# Patient Record
Sex: MALE | Race: BLACK OR AFRICAN AMERICAN | Employment: FULL TIME | URBAN - NONMETROPOLITAN AREA
[De-identification: names, ages, dates, MRNs, and addresses within clinical notes are randomized per-mention and may not be internally consistent; named-entity substitution may affect disease eponyms.]

---

## 2021-03-02 ENCOUNTER — HOSPITAL ENCOUNTER (EMERGENCY)
Age: 46
Discharge: HOME | End: 2021-03-02
Payer: COMMERCIAL

## 2021-03-02 VITALS
WEIGHT: 270 LBS | DIASTOLIC BLOOD PRESSURE: 118 MMHG | RESPIRATION RATE: 16 BRPM | SYSTOLIC BLOOD PRESSURE: 185 MMHG | TEMPERATURE: 97.6 F | OXYGEN SATURATION: 99 % | BODY MASS INDEX: 35.78 KG/M2 | HEIGHT: 73 IN | HEART RATE: 94 BPM

## 2021-03-02 DIAGNOSIS — I10 ASYMPTOMATIC HYPERTENSION: ICD-10-CM

## 2021-03-02 DIAGNOSIS — I10 UNCONTROLLED HYPERTENSION: Primary | ICD-10-CM

## 2021-03-02 PROCEDURE — 99282 EMERGENCY DEPT VISIT SF MDM: CPT

## 2021-03-02 PROCEDURE — 99205 OFFICE O/P NEW HI 60 MIN: CPT

## 2021-03-02 RX ORDER — AMLODIPINE BESYLATE 5 MG/1
5 TABLET ORAL DAILY
Qty: 30 TABLET | Refills: 0 | Status: SHIPPED | OUTPATIENT
Start: 2021-03-02 | End: 2021-03-02 | Stop reason: SDUPTHER

## 2021-03-02 RX ORDER — AMLODIPINE BESYLATE 5 MG/1
5 TABLET ORAL DAILY
Qty: 30 TABLET | Refills: 0 | Status: SHIPPED | OUTPATIENT
Start: 2021-03-02 | End: 2021-03-31

## 2021-03-02 ASSESSMENT — ENCOUNTER SYMPTOMS
EYE DISCHARGE: 0
TROUBLE SWALLOWING: 0
NAUSEA: 0
ABDOMINAL PAIN: 0
DIARRHEA: 0
COUGH: 0
EYE REDNESS: 0
SHORTNESS OF BREATH: 0
RHINORRHEA: 0
SORE THROAT: 0
VOMITING: 0

## 2021-03-02 ASSESSMENT — PAIN DESCRIPTION - DESCRIPTORS: DESCRIPTORS: ACHING

## 2021-03-02 ASSESSMENT — PAIN DESCRIPTION - FREQUENCY: FREQUENCY: CONTINUOUS

## 2021-03-02 ASSESSMENT — PAIN SCALES - GENERAL: PAINLEVEL_OUTOF10: 2

## 2021-03-02 NOTE — ED NOTES
To [facility] with complaints of HTN. States he was at the dentist to get some teeth pulled Thursday but they wouldn't do it because his bp was to high. Tried to call clinic on [facility],  was 3 weeks out so came here.  No PCP     Priya Owen RN  03/02/21 2840

## 2021-03-02 NOTE — ED TRIAGE NOTES
Pt presents to ED from  as a transfer for continued eval of HTN. Pt was scheduled to have a tooth pulled on 2/25/21 and was unable to get it pulled d/t HTN. Upon iniaital assessment, pt is A&Ox4, resps easy and unlabored. Pt reports \"I feel fine! \" Pt denies chest pain, shortness of breath, abdominal pain, n/v/d, cough or fever. Pt denies blurred vision however states a slight headache. Genevieve MIR at bedside for assessment. Will monitor.

## 2021-03-02 NOTE — ED NOTES
To go by private car to [address] [place] [address] , to remain NPO till seen in ED, in stable condition     Almaz Wallis, KARIN  03/02/21 9735

## 2021-03-02 NOTE — ED PROVIDER NOTES
[facility] Encounter      CHIEFCOMPLAINT       Chief Complaint   Patient presents with    Hypertension     went to get teeth pulled and bp was to high to get them pulled       Nurses Notes reviewed and I agree except as noted in the HPI. HISTORY OF PRESENT ILLNESS   Isabel Quinn is a 39 y.o. male who presents for evaluation of blood pressure. Patient states he had went to get some teeth pulled last Thursday when they refuse due to elevated blood pressure. Presents today for evaluation, treatment, and referral for PCP. Patient is asymptomatic. Denies recurrent headache. No chest pain or shortness of breath. Current tobacco user. No exercise outside of work. No additional complaints. REVIEW OF SYSTEMS     Review of Systems   Constitutional: Negative for chills, diaphoresis, fatigue and fever. HENT: Negative for congestion, ear pain, rhinorrhea, sore throat and trouble swallowing. Eyes: Negative for discharge and redness. Respiratory: Negative for cough and shortness of breath. Cardiovascular: Negative for chest pain, palpitations and leg swelling. Gastrointestinal: Negative for diarrhea, nausea and vomiting. Genitourinary: Negative for decreased urine volume. Musculoskeletal: Negative for neck pain and neck stiffness. Skin: Negative for rash. Neurological: Negative for headaches. Hematological: Negative for adenopathy. Psychiatric/Behavioral: Negative for sleep disturbance. PAST MEDICAL HISTORY   History reviewed. No pertinent past medical history. SURGICAL HISTORY     Patient  has no past surgical history on file.     CURRENT MEDICATIONS       Previous Medications    ACETAMINOPHEN (TYLENOL) 500 MG TABLET    Take 500 mg by mouth every 6 hours as needed for Pain    AMOXICILLIN PO    Take by mouth    IBUPROFEN (ADVIL;MOTRIN) 400 MG TABLET    Take 400 mg by mouth every 6 hours as needed for Pain       ALLERGIES     Patient is has No Known Allergies. FAMILY HISTORY     Patient'sfamily history is not on file. SOCIAL HISTORY     Patient  reports that he has been smoking. He has been smoking about 1.00 pack per day. He has never used smokeless tobacco. He reports that he does not drink alcohol or use drugs. PHYSICAL EXAM     ED TRIAGE VITALS  BP: (!) 203/127, Temp: 97.6 °F (36.4 °C), Pulse: 85, Resp: 16, SpO2: 99 %  Physical Exam  Vitals signs and nursing note reviewed. Constitutional:       General: He is not in acute distress. Appearance: Normal appearance. He is well-developed. He is not ill-appearing, toxic-appearing or diaphoretic. HENT:      Head: Normocephalic and atraumatic. Jaw: No trismus. Right Ear: Hearing, tympanic membrane, ear canal and external ear normal. No mastoid tenderness. No hemotympanum. Tympanic membrane is not perforated, erythematous or bulging. Left Ear: Hearing, tympanic membrane, ear canal and external ear normal. No mastoid tenderness. No hemotympanum. Tympanic membrane is not perforated, erythematous or bulging. Nose: Nose normal.      Mouth/Throat:      Mouth: Mucous membranes are moist.      Pharynx: Oropharynx is clear. Uvula midline. Tonsils: No tonsillar abscesses. Eyes:      General: No scleral icterus. Extraocular Movements: Extraocular movements intact. Conjunctiva/sclera: Conjunctivae normal.      Pupils: Pupils are equal, round, and reactive to light. Neck:      Musculoskeletal: Normal range of motion and neck supple. Thyroid: No thyromegaly. Trachea: Trachea normal.   Cardiovascular:      Rate and Rhythm: Normal rate and regular rhythm. No extrasystoles are present. Chest Wall: PMI is not displaced. Heart sounds: Normal heart sounds. No murmur. No friction rub. No gallop. Pulmonary:      Effort: Pulmonary effort is normal. No accessory muscle usage or respiratory distress. Breath sounds: Normal breath sounds. Musculoskeletal:      Right lower leg: Edema (1+) present. Left lower leg: Edema (1+) present. Lymphadenopathy:      Head:      Right side of head: No submental, submandibular, tonsillar, preauricular, posterior auricular or occipital adenopathy. Left side of head: No submental, submandibular, tonsillar, preauricular, posterior auricular or occipital adenopathy. Cervical: No cervical adenopathy. Upper Body:      Right upper body: No supraclavicular adenopathy. Left upper body: No supraclavicular adenopathy. Skin:     General: Skin is warm and dry. Coloration: Skin is not pale. Findings: No rash. Comments: Skin intact, warm and dry to touch, no rashes noted on exposed surfaces. Neurological:      Mental Status: He is alert and oriented to person, place, and time. He is not disoriented. Psychiatric:         Mood and Affect: Mood normal.         Behavior: Behavior is cooperative. DIAGNOSTIC RESULTS   Labs: No results found for this visit on 03/02/21. IMAGING:  No orders to display     URGENT CARE COURSE:     Vitals:    03/02/21 1257 03/02/21 1317   BP: (!) 210/125 (!) 203/127   Pulse: 85    Resp: 16    Temp: 97.6 °F (36.4 °C)    TempSrc: Temporal    SpO2: 99%    Weight: 270 lb (122.5 kg)    Height: 6' 1\" (1.854 m)        Medications - No data to display  PROCEDURES:  None  FINALIMPRESSION      1. Uncontrolled hypertension        DISPOSITION/PLAN   DISPOSITION Decision To Transfer 03/02/2021 01:17:53 PM  Patient is going to [facility] ED for further evaluation of uncontrolled hypertension. He refused EMS. He was explained the risks including worsening condition, including stroke and/or heart attack. Patient advised to go directly to ER. Report called to [email or web address]. PATIENT REFERRED TO:  [facility] [address] [address] EMERGENCY DEPT  [address]    Go directly to ER.     DISCHARGE MEDICATIONS:  New Prescriptions    No medications on file Current Discharge Medication List          RODRI Restrepo CNP, APRN - CNP  03/02/21 7378

## 2021-03-30 ENCOUNTER — TELEPHONE (OUTPATIENT)
Dept: PRIMARY CARE CLINIC | Age: 46
End: 2021-03-30

## 2021-03-30 NOTE — TELEPHONE ENCOUNTER
PATIENT: Marcio Valentin    PROVIDER: [facility]     Patent is scheduled for a new patient appt. On 4/12/2021 with Dr. Leonard Bill and says that he will be running out of hs medications soon and wanted to see if he can be seen earlier then that or if he can get prescribed something to last him until his visit.

## 2021-03-30 NOTE — TELEPHONE ENCOUNTER
Future Appointments   Date Time Provider Farhana El   4/16/2021 10:20 AM [address] [facility]

## 2021-03-31 ENCOUNTER — HOSPITAL ENCOUNTER (OUTPATIENT)
Dept: GENERAL RADIOLOGY | Age: 46
Discharge: HOME | End: 2021-03-31
Payer: COMMERCIAL

## 2021-03-31 ENCOUNTER — HOSPITAL ENCOUNTER (OUTPATIENT)
Age: 46
Discharge: HOME | End: 2021-03-31
Payer: COMMERCIAL

## 2021-03-31 ENCOUNTER — OFFICE VISIT (OUTPATIENT)
Dept: FAMILY MEDICINE CLINIC | Age: 46
End: 2021-03-31
Payer: COMMERCIAL

## 2021-03-31 VITALS
DIASTOLIC BLOOD PRESSURE: 119 MMHG | TEMPERATURE: 97.4 F | HEART RATE: 93 BPM | OXYGEN SATURATION: 98 % | BODY MASS INDEX: 36.95 KG/M2 | RESPIRATION RATE: 16 BRPM | WEIGHT: 278.8 LBS | SYSTOLIC BLOOD PRESSURE: 174 MMHG | HEIGHT: 73 IN

## 2021-03-31 DIAGNOSIS — E66.9 OBESITY (BMI 30-39.9): Chronic | ICD-10-CM

## 2021-03-31 DIAGNOSIS — F17.210 CIGARETTE NICOTINE DEPENDENCE WITHOUT COMPLICATION: Chronic | ICD-10-CM

## 2021-03-31 DIAGNOSIS — M79.672 LEFT FOOT PAIN: ICD-10-CM

## 2021-03-31 DIAGNOSIS — I10 HYPERTENSION, ESSENTIAL: Primary | ICD-10-CM

## 2021-03-31 PROCEDURE — 99204 OFFICE O/P NEW MOD 45 MIN: CPT | Performed by: FAMILY MEDICINE

## 2021-03-31 PROCEDURE — 73630 X-RAY EXAM OF FOOT: CPT

## 2021-03-31 PROCEDURE — 93000 ELECTROCARDIOGRAM COMPLETE: CPT | Performed by: FAMILY MEDICINE

## 2021-03-31 RX ORDER — BUPROPION HYDROCHLORIDE 150 MG/1
TABLET, EXTENDED RELEASE ORAL
Qty: 180 TABLET | Refills: 0 | Status: SHIPPED | OUTPATIENT
Start: 2021-03-31 | End: 2021-04-29 | Stop reason: SINTOL

## 2021-03-31 RX ORDER — AMLODIPINE BESYLATE 10 MG/1
10 TABLET ORAL DAILY
Qty: 90 TABLET | Refills: 1 | Status: SHIPPED | OUTPATIENT
Start: 2021-03-31 | End: 2021-06-25 | Stop reason: SDUPTHER

## 2021-03-31 RX ORDER — LISINOPRIL 20 MG/1
20 TABLET ORAL DAILY
Qty: 90 TABLET | Refills: 1 | Status: SHIPPED | OUTPATIENT
Start: 2021-03-31 | End: 2021-04-29 | Stop reason: DRUGHIGH

## 2021-03-31 ASSESSMENT — PATIENT HEALTH QUESTIONNAIRE - PHQ9
SUM OF ALL RESPONSES TO PHQ QUESTIONS 1-9: 0
SUM OF ALL RESPONSES TO PHQ QUESTIONS 1-9: 0
2. FEELING DOWN, DEPRESSED OR HOPELESS: 0

## 2021-03-31 NOTE — LETTER
[facility]  [address] [place] [address]. [facility] [address]  Phone: [phone]  Fax: [address] , DO      March 31, 2021    Patient: Deandre Kaplan   YOB: 1975   Date of Visit: 3/31/2021       To Whom it May Concern:    Wanda Marion was seen in my clinic on 3/31/2021. If you have any questions or concerns, please don't hesitate to call.       Sincerely,         Mario King, DO

## 2021-03-31 NOTE — PATIENT INSTRUCTIONS
LAB INSTRUCTIONS:    Please complete labs within 3 week(s). Please fast for 8 hours prior to lab collection. The clinic will call you within 1 week of collection. If you have not heard from us within that amount of time, please call us at [phone]. Patient Education        DASH Diet: Care Instructions  Your Care Instructions     The DASH diet is an eating plan that can help lower your blood pressure. DASH stands for Dietary Approaches to Stop Hypertension. Hypertension is high blood pressure. The DASH diet focuses on eating foods that are high in calcium, potassium, and magnesium. These nutrients can lower blood pressure. The foods that are highest in these nutrients are fruits, vegetables, low-fat dairy products, nuts, seeds, and legumes. But taking calcium, potassium, and magnesium supplements instead of eating foods that are high in those nutrients does not have the same effect. The DASH diet also includes whole grains, fish, and poultry. The DASH diet is one of several lifestyle changes your doctor may recommend to lower your high blood pressure. Your doctor may also want you to decrease the amount of sodium in your diet. Lowering sodium while following the DASH diet can lower blood pressure even further than just the DASH diet alone. Follow-up care is a key part of your treatment and safety. Be sure to make and go to all appointments, and call your doctor if you are having problems. It's also a good idea to know your test results and keep a list of the medicines you take. How can you care for yourself at home? Following the DASH diet  · Eat 4 to 5 servings of fruit each day. A serving is 1 medium-sized piece of fruit, ½ cup chopped or canned fruit, 1/4 cup dried fruit, or 4 ounces (½ cup) of fruit juice. Choose fruit more often than fruit juice. · Eat 4 to 5 servings of vegetables each day.  A serving is 1 cup of lettuce or raw leafy vegetables, ½ cup of chopped or cooked vegetables, or 4 ounces cucumber, and onion to sandwiches. ? Combine a ready-made pizza crust with low-fat mozzarella cheese and lots of vegetable toppings. Try using tomatoes, squash, spinach, broccoli, carrots, cauliflower, and onions. ? Have a variety of cut-up vegetables with a low-fat dip as an appetizer instead of chips and dip. ? Sprinkle sunflower seeds or chopped almonds over salads. Or try adding chopped walnuts or almonds to cooked vegetables. ? Try some vegetarian meals using beans and peas. Add garbanzo or kidney beans to salads. Make burritos and tacos with mashed nunez beans or black beans. Where can you learn more? Go to https://earnest.UpSpring. org and sign in to your MightyHive account. Enter C972 in the BettrLife box to learn more about \"DASH Diet: Care Instructions. \"     If you do not have an account, please click on the \"Sign Up Now\" link. Current as of: August 31, 2020               Content Version: 12.8  © 0401-6944 [employer]. Care instructions adapted under license by [facility] ([facility]). If you have questions about a medical condition or this instruction, always ask your healthcare professional. [address] any warranty or liability for your use of this information. Patient Education        High Blood Pressure: Care Instructions  Overview     It's normal for blood pressure to go up and down throughout the day. But if it stays up, you have high blood pressure. Another name for high blood pressure is hypertension. Despite what a lot of people think, high blood pressure usually doesn't cause headaches or make you feel dizzy or lightheaded. It usually has no symptoms. But it does increase your risk of stroke, heart attack, and other problems. You and your doctor will talk about your risks of these problems based on your blood pressure. Your doctor will give you a goal for your blood pressure.  Your goal will be based on your health and your age.  Lifestyle changes, such as eating healthy and being active, are always important to help lower blood pressure. You might also take medicine to reach your blood pressure goal.  Follow-up care is a key part of your treatment and safety. Be sure to make and go to all appointments, and call your doctor if you are having problems. It's also a good idea to know your test results and keep a list of the medicines you take. How can you care for yourself at home? Medical treatment  · If you stop taking your medicine, your blood pressure will go back up. You may take one or more types of medicine to lower your blood pressure. Be safe with medicines. Take your medicine exactly as prescribed. Call your doctor if you think you are having a problem with your medicine. · Talk to your doctor before you start taking aspirin every day. Aspirin can help certain people lower their risk of a heart attack or stroke. But taking aspirin isn't right for everyone, because it can cause serious bleeding. · See your doctor regularly. You may need to see the doctor more often at first or until your blood pressure comes down. · If you are taking blood pressure medicine, talk to your doctor before you take decongestants or anti-inflammatory medicine, such as ibuprofen. Some of these medicines can raise blood pressure. · Learn how to check your blood pressure at home. Lifestyle changes  · Stay at a healthy weight. This is especially important if you put on weight around the waist. Losing even 10 pounds can help you lower your blood pressure. · If your doctor recommends it, get more exercise. Walking is a good choice. Bit by bit, increase the amount you walk every day. Try for at least 30 minutes on most days of the week. You also may want to swim, bike, or do other activities. · Avoid or limit alcohol. Talk to your doctor about whether you can drink any alcohol.   · Try to limit how much sodium you eat to less than 2,300 milligrams (mg) a day. Your doctor may ask you to try to eat less than 1,500 mg a day. · Eat plenty of fruits (such as bananas and oranges), vegetables, legumes, whole grains, and low-fat dairy products. · Lower the amount of saturated fat in your diet. Saturated fat is found in animal products such as milk, cheese, and meat. Limiting these foods may help you lose weight and also lower your risk for heart disease. · Do not smoke. Smoking increases your risk for heart attack and stroke. If you need help quitting, talk to your doctor about stop-smoking programs and medicines. These can increase your chances of quitting for good. When should you call for help? Call  911 anytime you think you may need emergency care. This may mean having symptoms that suggest that your blood pressure is causing a serious heart or blood vessel problem. Your blood pressure may be over 180/120. For example, call 911 if:    · You have symptoms of a heart attack. These may include:  ? Chest pain or pressure, or a strange feeling in the chest.  ? Sweating. ? Shortness of breath. ? Nausea or vomiting. ? Pain, pressure, or a strange feeling in the back, neck, jaw, or upper belly or in one or both shoulders or arms. ? Lightheadedness or sudden weakness. ? A fast or irregular heartbeat.     · You have symptoms of a stroke. These may include:  ? Sudden numbness, tingling, weakness, or loss of movement in your face, arm, or leg, especially on only one side of your body. ? Sudden vision changes. ? Sudden trouble speaking. ? Sudden confusion or trouble understanding simple statements. ? Sudden problems with walking or balance. ? A sudden, severe headache that is different from past headaches.     · You have severe back or belly pain. Do not wait until your blood pressure comes down on its own. Get help right away.   Call your doctor now or seek immediate care if:    · Your blood pressure is much higher than normal (such as 180/120 or higher), but you don't have symptoms.     · You think high blood pressure is causing symptoms, such as:  ? Severe headache.  ? Blurry vision. Watch closely for changes in your health, and be sure to contact your doctor if:    · Your blood pressure measures higher than your doctor recommends at least 2 times. That means the top number is higher or the bottom number is higher, or both.     · You think you may be having side effects from your blood pressure medicine. Where can you learn more? Go to https://SonicLivingpeUS [employer]YellowHammer. org and sign in to your Clipboard account. Enter F712 in the GridCure box to learn more about \"High Blood Pressure: Care Instructions. \"     If you do not have an account, please click on the \"Sign Up Now\" link. Current as of: August 31, 2020               Content Version: 12.8  © 2544-7210 [employer]. Care instructions adapted under license by [facility] ([facility]). If you have questions about a medical condition or this instruction, always ask your healthcare professional. [address] any warranty or liability for your use of this information.

## 2021-03-31 NOTE — PROGRESS NOTES
Chief Complaint   Patient presents with    New Patient     no previous pcp    Hypertension    Foot Pain     L foot, going on 3 yeras    Obesity    Nicotine Dependence       History obtained from the patient. SUBJECTIVE:  Isabel Quinn is a 42 y.o. male that presents today for establishing care with new physician, etc. New patient, 1st time visit to [facility] @ [facility]. -HTN    HPI:  Noted high in UC/ED last month  However, states it's been high before, but never treated  Thinks was on BP meds in  for a short time, but stopped  Both parents with HTN, mom is on 5 or 6 meds    Elevated BP first noted - as above  History of CAD? No  History of dyslipidemia? No  History of DM? No    Family History of HTN? Yes  Family History of Premature CVD, CVA, CKD or DM? No    Smoker? Yes  Alcohol or Substance use? No  Regular exercise? No  Hx of Thyroid Problems? No  OCP use? No  Regular NSAID use? No  Corticosteroid use? No  Changes in urinary habits? No  Episodes of Headaches, palpitations and/or diaphoresis? No  Does patient snore? No    Shortness of breath or chest pain? No  Headache or visual complaints? No  Neurologic changes like confusion? No  Extremity edema? No    BP Readings from Last 3 Encounters:   21 (!) 174/119   21 (!) 185/118   16 (!) 155/99         -L foot pain:  Going on the last 3 years  There is a nodule or something on the lateral side of L foot, mid part. Hurts when wearing boots, worse as day goes on  Better when in tennis shoes  No injury       -Obesity:  Working on lifestyle changes.    Diet not the best  Does not exercise much      -Smokin PPD x 20 years  Would like to quit  Was on chantix in remote past and did not tolerate  Would like to try wellbutrin      Age/Gender Health Maintenance    Lipid - No results found for: CHOL  No results found for: TRIG  No results found for: HDL  No results found for: LDLCHOLESTEROL, LDLCALC  No results found for: LABVLDL, VLDL  No results found for: CHOLHDLRATIO    DM Screen - No results found for: GLUCOSE     Colon Cancer Screening - age 48  Lung Cancer Screening (Age 54 to [de-identified] with 27 pack year hx, current smoker or quit within past 15 years) - age 54 of meets criteria    Tetanus - discuss next visit  Influenza Vaccine - candidate FALL 2021  Pneumonia Vaccine - discuss next visit  Zoster - age 50     PSA testing discussion - age 56  AAA Screening - age 74, smoked    Falls screening - n/a      Current Outpatient Medications   Medication Sig Dispense Refill    amLODIPine (NORVASC) 10 MG tablet Take 1 tablet by mouth daily 90 tablet 1    lisinopril (PRINIVIL;ZESTRIL) 20 MG tablet Take 1 tablet by mouth daily 90 tablet 1    buPROPion (WELLBUTRIN SR) 150 MG extended release tablet Take 1 tab by mouth daily x 3 days, then increase to twice daily for 3 months. 180 tablet 0    acetaminophen (TYLENOL) 500 MG tablet Take 500 mg by mouth every 6 hours as needed for Pain      ibuprofen (ADVIL;MOTRIN) 400 MG tablet Take 400 mg by mouth every 6 hours as needed for Pain       No current facility-administered medications for this visit. Orders Placed This Encounter   Medications    amLODIPine (NORVASC) 10 MG tablet     Sig: Take 1 tablet by mouth daily     Dispense:  90 tablet     Refill:  1    lisinopril (PRINIVIL;ZESTRIL) 20 MG tablet     Sig: Take 1 tablet by mouth daily     Dispense:  90 tablet     Refill:  1    buPROPion (WELLBUTRIN SR) 150 MG extended release tablet     Sig: Take 1 tab by mouth daily x 3 days, then increase to twice daily for 3 months. Dispense:  180 tablet     Refill:  0         All medications reviewed and reconciled, including OTC and herbal medications. Updated list given to patient. Patient Active Problem List    Diagnosis Date Noted    Hypertension, essential     Nicotine dependence     Obesity (BMI 30-39. 9)        Past Medical History:   Diagnosis Date    Hypertension, essential     Nicotine dependence     Obesity (BMI 30-39. 9)          History reviewed. No pertinent surgical history. No Known Allergies      Social History     Tobacco Use    Smoking status: Current Every Day Smoker     Packs/day: 1.00     Start date: 2001    Smokeless tobacco: Never Used   Substance Use Topics    Alcohol use: No         Family History   Problem Relation Age of Onset    High Blood Pressure Mother     High Blood Pressure Father     Diabetes Father     Prostate Cancer Maternal Grandfather         ?76s when diagnose    Colon Cancer Neg Hx          I have reviewed the patient's past medical history, past surgical history, allergies, medications, social and family history and I have made updates where appropriate.       Review of Systems  Positive responses are highlighted in bold    Constitutional:  Fever, Chills, Night Sweats, Fatigue, Unexpected changes in weight  Eyes:  Eye discharge, Eye pain, Eye redness, Visual disturbances   HENT:  Ear pain, Tinnitus, Nosebleeds, Trouble swallowing, Hearing loss, Sore throat  Cardiovascular:  Chest Pain, Palpitations, Orthopnea, Paroxysmal Nocturnal Dyspnea  Respiratory:  Cough, Wheezing, Shortness of breath, Chest tightness, Apnea  Gastrointestinal:  Nausea, Vomiting, Diarrhea, Constipation, Heartburn, Blood in stool  Genitourinary:  Difficulty or painful urination, Flank pain, Change in frequency, Urgency  Skin:  Color change, Rash, Itching, Wound  Psychiatric:  Hallucinations, Anxiety, Depression, Suicidal ideation  Hematological:  Enlarged glands, Easy bleeding, Easily bruising  Musculoskeletal:  Joint pain, Back pain, Gait problems, Joint swelling, Myalgias  Neurological:  Dizziness, Headaches, Presyncope, Numbness, Seizures, Tremors  Allergy:  Environmental allergies, Food allergies  Endocrine:  Heat Intolerance, Cold Intolerance, Polydipsia, Polyphagia, Polyuria      PHYSICAL EXAM:  Vitals:    03/31/21 1033   BP: (!) 174/119   Pulse: 93   Resp: 16   Temp: 97.4 °F (36.3 °C)   TempSrc: Temporal   SpO2: 98%   Weight: 278 lb 12.8 oz (126.5 kg)   Height: 6' 0.5\" (1.842 m)     Body mass index is 37.29 kg/m². Pain Score: 2(L foot)    VS Reviewed  General Appearance: A&O x 3, No acute distress,well developed and well- nourished  Head: normocephalic and atraumatic  Eyes: pupils equal, round, and reactive to light, extraocular eye movements intact, conjunctivae and eye lids without erythema  ENT: external ear and ear canal clear bilaterally, TMs intact and regular, nose without deformity, nasal mucosa and turbinates normal without polyps, oropharynx normal, dentition is normal for age  Neck: supple and non-tender without mass, no thyromegaly or thyroid nodules, no cervical lymphadenopathy  Pulmonary/Chest: clear to auscultation bilaterally- no wheezes, rales or rhonchi, normal air movement, no respiratory distress or retractions  Cardiovascular: S1 and S2 auscultated w/ RRR. No murmurs, rubs, clicks, or gallops, distal pulses intact. Abdomen: soft, non-tender, non-distended, bowel sounds physiologic,  no rebound or guarding, no masses or hernias noted. Liver and spleen without enlargement. Extremities: no cyanosis, clubbing or edema of the lower extremities. +2 PT/DP bilaterally. Musculoskeletal: No joint swelling or gross deformity   Neuro:  Alert, 5/5 strength globally and symmetrically, 2+ patellar reflexes bilaterally  Psych: Affect appropriate. Mood normal. Thought process is normal without evidence of depression or psychosis. Good insight and appropriate interaction. Cognition and memory appear to be intact. Skin: warm and dry, no rash or erythema  Lymph:  No cervical, auricular or supraclavicular lymph nodes palpated  L Foot: no swelling, tenderness, instability; ligaments intact, full range of motion of all ankle/foot joints. + TTP with firm nodule on lateral mid L foot. EKG: NSR, no ST-T changes,       ASSESSMENT & PLAN  1.  Hypertension, essential    Uncontrolled  No concerning s/s  EKG reassuring    Plan:  Inc norvasc  Add lisinopril 20mg  Get BP cuff and keep log  DASH diet  Labs below  F/u 4 wks    - CBC Auto Differential; Future  - Comprehensive Metabolic Panel; Future  - Lipid Panel; Future  - Microalbumin / Creatinine Urine Ratio; Future  - TSH with Reflex; Future  - EKG 12 Lead  - amLODIPine (NORVASC) 10 MG tablet; Take 1 tablet by mouth daily  Dispense: 90 tablet; Refill: 1  - lisinopril (PRINIVIL;ZESTRIL) 20 MG tablet; Take 1 tablet by mouth daily  Dispense: 90 tablet; Refill: 1    2. Left foot pain    ? Bony growth  Will get xray and go from there    - XR FOOT LEFT (MIN 3 VIEWS); Future    3. Obesity (BMI 30-39. 9)    Discussed wt loss strategies    4. Cigarette nicotine dependence without complication    Start wellbutrin  Reassess 4 wks  Call sooner if needs to add patches/gum    - buPROPion (WELLBUTRIN SR) 150 MG extended release tablet; Take 1 tab by mouth daily x 3 days, then increase to twice daily for 3 months. Dispense: 180 tablet; Refill: 0      DISPOSITION    Return in about 4 weeks (around 4/28/2021) for f/u high blood pressure, sooner as needed. Reynaldo released without restrictions. PATIENT COUNSELING    Counseling was provided today regarding the following topics: Healthy eating habits, Regular exercise, substance abuse and healthy sleep habits. Reynaldo received counseling on the following healthy behaviors: nutrition, exercise, medication adherence and tobacco cessation    Patient given educational materials on: See Attached    I have instructed Reynaldo to complete a self tracking handout on Blood Pressures  and Smoking and instructed them to bring it with them to his next appointment. Barriers to learning and self management: none    Discussed use, benefit, and side effects of prescribed medications. Barriers to medication compliance addressed. All patient questions answered. Pt voiced understanding. Electronically signed by Amber Mabry DO on 3/31/2021 at 11:29 AM

## 2021-04-01 ENCOUNTER — TELEPHONE (OUTPATIENT)
Dept: FAMILY MEDICINE CLINIC | Age: 46
End: 2021-04-01

## 2021-04-25 DIAGNOSIS — R73.9 HYPERGLYCEMIA: Primary | ICD-10-CM

## 2021-04-25 LAB
ABSOLUTE BASO #: 0.2 X10E9/L (ref 0–0.2)
ABSOLUTE EOS #: 0.2 X10E9/L (ref 0–0.4)
ABSOLUTE LYMPH #: 2.8 X10E9/L (ref 1–3.5)
ABSOLUTE MONO #: 0.5 X10E9/L (ref 0–0.9)
ABSOLUTE NEUT #: 2.8 X10E9/L (ref 1.5–6.6)
ALBUMIN SERPL-MCNC: 4.3 G/DL (ref 3.2–5.3)
ALK PHOSPHATASE: 65 U/L (ref 39–130)
ALT SERPL-CCNC: 22 U/L (ref 0–40)
ANION GAP SERPL CALCULATED.3IONS-SCNC: 5 MMOL/L (ref 5–15)
AST SERPL-CCNC: 21 U/L (ref 0–41)
BASOPHILS RELATIVE PERCENT: 2.4 %
BILIRUB SERPL-MCNC: 0.5 MG/DL (ref 0.3–1.2)
BUN BLDV-MCNC: 12 MG/DL (ref 5–23)
CALCIUM SERPL-MCNC: 9.6 MG/DL (ref 8.5–10.5)
CHLORIDE BLD-SCNC: 106 MMOL/L (ref 98–109)
CHOLESTEROL/HDL RATIO: 4.4 (ref 1–5)
CHOLESTEROL: 164 MG/DL (ref 150–200)
CO2: 32 MMOL/L (ref 22–32)
CREAT SERPL-MCNC: 0.95 MG/DL (ref 0.6–1.3)
CREATINE, URINE: 164.66 MG/DL
EGFR AFRICAN AMERICAN: >60 ML/MIN/1.73SQ.M
EGFR IF NONAFRICAN AMERICAN: >60 ML/MIN/1.73SQ.M
EOSINOPHILS RELATIVE PERCENT: 2.7 %
GLUCOSE: 116 MG/DL (ref 65–99)
HCT VFR BLD CALC: 43.8 % (ref 39–49)
HDLC SERPL-MCNC: 37 MG/DL
HEMOGLOBIN: 14.5 G/DL (ref 13–17)
LDL CHOLESTEROL CALCULATED: 113 MG/DL
LDL/HDL RATIO: 3.1
LYMPHOCYTE %: 43.7 %
MCH RBC QN AUTO: 29.9 PG (ref 27–34)
MCHC RBC AUTO-ENTMCNC: 33.2 G/DL (ref 32–36)
MCV RBC AUTO: 90 FL (ref 80–100)
MICROALBUMIN/CREAT 24H UR: 1.4 MG/DL (ref 0–1.9)
MICROALBUMIN/CREAT UR-RTO: 8.5 MG/G CREAT (ref 0–30)
MONOCYTES # BLD: 7.8 %
NEUTROPHILS RELATIVE PERCENT: 43.4 %
PDW BLD-RTO: 13.4 % (ref 11.5–15)
PLATELETS: 246 X10E9/L (ref 150–450)
PMV BLD AUTO: 8.5 FL (ref 7–12)
POTASSIUM SERPL-SCNC: 4.4 MMOL/L (ref 3.5–5)
RBC: 4.86 X10E12/L (ref 4.1–5.7)
SODIUM BLD-SCNC: 143 MMOL/L (ref 134–146)
TOTAL PROTEIN: 7.1 G/DL (ref 6–8)
TRIGL SERPL-MCNC: 68 MG/DL (ref 27–150)
TSH SERPL DL<=0.05 MIU/L-ACNC: 0.83 UIU/ML (ref 0.49–4.67)
VLDLC SERPL CALC-MCNC: 14 MG/DL (ref 0–30)
WBC: 6.3 X10E9/L (ref 4–11)

## 2021-04-26 ENCOUNTER — TELEPHONE (OUTPATIENT)
Dept: FAMILY MEDICINE CLINIC | Age: 46
End: 2021-04-26

## 2021-04-26 NOTE — TELEPHONE ENCOUNTER
----- Message from Devyn Pederson DO sent at 4/25/2021  9:02 AM EDT -----  Please let pt know that labs look good other than blood sugar mildly elevated at 117  I've ordered some f/u fasting labs to ensure not trending towards diabetes. Will review further at f/u visit 4/29. Let me know if questions, thanks!

## 2021-04-26 NOTE — TELEPHONE ENCOUNTER
Patient has been informed and voiced understanding   Patient to have labs done prior to 04.29.2021 appt   Orders faxed to [facility] on [address]

## 2021-04-27 NOTE — PROGRESS NOTES
Chief Complaint   Patient presents with    Follow-up     HTN, labs, smoking and L foot pain    Foot Pain       History obtained from the patient. SUBJECTIVE:  Bridger Schwab is a 42 y.o. male that presents today for     -HTN LAST VISIT:    HPI:  Noted high in UC/ED last month  However, states it's been high before, but never treated  Thinks was on BP meds in  for a short time, but stopped  Both parents with HTN, mom is on 5 or 6 meds    Elevated BP first noted - as above  History of CAD? No  History of dyslipidemia? No  History of DM? No    Family History of HTN? Yes  Family History of Premature CVD, CVA, CKD or DM? No    Smoker? Yes  Alcohol or Substance use? No  Regular exercise? No  Hx of Thyroid Problems? No  OCP use? No  Regular NSAID use? No  Corticosteroid use? No  Changes in urinary habits? No  Episodes of Headaches, palpitations and/or diaphoresis? No  Does patient snore? No    Shortness of breath or chest pain? No  Headache or visual complaints? No  Neurologic changes like confusion? No  Extremity edema? No    UPDATE TODAY:   Not checking BP's at home  Taking meds as rx'd  Did do labs  No CP/sOb  BP's better today than 4 wks ago  Tolerating meds well     BP Readings from Last 3 Encounters:   21 (!) 145/100   21 (!) 174/119   21 (!) 185/118         -L foot pain LAST VISIT:  Going on the last 3 years  There is a nodule or something on the lateral side of L foot, mid part. Hurts when wearing boots, worse as day goes on  Better when in tennis shoes  No injury     UPDATE TODAY:   No change in above  Had xray, here to review. It was neg  Discussed podiatry referral, can't right now d/t work  Taking motrin and [employer].  Asking what he can do in the short-erm      -Hyperglycemia:  Noted on labs  F/u FBS/a1c c/w pre DM  Discussed today      -Smokin PPD x 20 years  Would like to quit  Was on chantix in remote past and did not tolerate  Would like to try Smoking cessation 110 each 3    amLODIPine (NORVASC) 10 MG tablet Take 1 tablet by mouth daily 90 tablet 1    acetaminophen (TYLENOL) 500 MG tablet Take 500 mg by mouth every 6 hours as needed for Pain      ibuprofen (ADVIL;MOTRIN) 400 MG tablet Take 400 mg by mouth every 6 hours as needed for Pain       No current facility-administered medications for this visit. Orders Placed This Encounter   Medications    meloxicam (MOBIC) 15 MG tablet     Sig: Take 1 tablet by mouth daily as needed for Pain     Dispense:  30 tablet     Refill:  3    nicotine (NICODERM CQ) 21 MG/24HR     Sig: Place 1 patch onto the skin every 24 hours X 6 weeks. Remove patch at bedtime. Reapply new patch next morning     Dispense:  42 patch     Refill:  0    nicotine (NICODERM CQ) 14 MG/24HR     Sig: Place 1 patch onto the skin every 24 hours for 14 days X 2 weeks. Remove patch at bedtime. Reapply new patch next morning     Dispense:  14 patch     Refill:  0    nicotine (NICODERM CQ) 7 MG/24HR     Sig: Place 1 patch onto the skin every 24 hours for 14 days X 2 weeks. Remove patch at bedtime. Reapply new patch next morning     Dispense:  14 patch     Refill:  0    nicotine polacrilex (NICORETTE) 2 MG gum     Sig: Take 1 each by mouth as needed for Smoking cessation     Dispense:  110 each     Refill:  3         All medications reviewed and reconciled, including OTC and herbal medications. Updated list given to patient. Patient Active Problem List    Diagnosis Date Noted    Prediabetes 04/29/2021    Left foot pain 04/29/2021    Hypertension, essential     Nicotine dependence     Obesity (BMI 30-39. 9)        Past Medical History:   Diagnosis Date    Hypertension, essential     Nicotine dependence     Obesity (BMI 30-39. 9)     Prediabetes 4/29/2021         History reviewed. No pertinent surgical history.       No Known Allergies      Social History     Tobacco Use    Smoking status: Current Every Day Smoker     Packs/day: 1.00     Start date: 2001    Smokeless tobacco: Never Used   Substance Use Topics    Alcohol use: No         Family History   Problem Relation Age of Onset    High Blood Pressure Mother     High Blood Pressure Father     Diabetes Father     Prostate Cancer Maternal Grandfather         ?76s when diagnose    Colon Cancer Neg Hx          I have reviewed the patient's past medical history, past surgical history, allergies, medications, social and family history and I have made updates where appropriate. Review of Systems  Positive responses are highlighted in bold    Constitutional:  Fever, Chills, Night Sweats, Fatigue, Unexpected changes in weight  HENT:  Ear pain, Tinnitus, Nosebleeds, Trouble swallowing, Hearing loss, Sore throat  Cardiovascular:  Chest Pain, Palpitations, Orthopnea, Paroxysmal Nocturnal Dyspnea  Respiratory:  Cough, Wheezing, Shortness of breath, Chest tightness, Apnea  Gastrointestinal:  Nausea, Vomiting, Diarrhea, Constipation, Heartburn, Blood in stool  Genitourinary:  Difficulty or painful urination, Flank pain, Change in frequency, Urgency  Skin:  Color change, Rash, Itching, Wound  Musculoskeletal:  Joint pain, Back pain, Gait problems, Joint swelling, Myalgias  Neurological:  Dizziness, Headaches, Presyncope, Numbness, Seizures, Tremors  Endocrine:  Heat Intolerance, Cold Intolerance, Polydipsia, Polyphagia, Polyuria      PHYSICAL EXAM:  Vitals:    04/29/21 1623 04/29/21 1751   BP: (!) 148/102 (!) 145/100   Pulse: 91    Resp: 12    Temp: 98.4 °F (36.9 °C)    TempSrc: Oral    SpO2: 100%    Weight: 281 lb (127.5 kg)    Height: 6' 0.5\" (1.842 m)      Body mass index is 37.59 kg/m². Pain Score:    3(L foot)    VS Reviewed  General Appearance: A&O x 3, No acute distress,well developed and well- nourished  Eyes: pupils equal, round, and reactive to light, extraocular eye movements intact, conjunctivae and eye lids without erythema  ENT: external ear and ear canal clear bilaterally, TMs intact and regular, nose without deformity, nasal mucosa and turbinates normal without polyps, oropharynx normal, dentition is normal for age  Neck: supple and non-tender without mass, no thyromegaly or thyroid nodules, no cervical lymphadenopathy  Pulmonary/Chest: clear to auscultation bilaterally- no wheezes, rales or rhonchi, normal air movement, no respiratory distress or retractions  Cardiovascular: S1 and S2 auscultated w/ RRR. No murmurs, rubs, clicks, or gallops, distal pulses intact. Abdomen: soft, non-tender, non-distended, bowel sounds physiologic,  no rebound or guarding, no masses or hernias noted. Liver and spleen without enlargement. Extremities: no cyanosis, clubbing or edema of the lower extremities. Skin: warm and dry, no rash or erythema  L Foot: no swelling, tenderness, instability; ligaments intact, full range of motion of all ankle/foot joints. + TTP with firm nodule on lateral mid L foot. Orders Only on 04/25/2021   Component Date Value Ref Range Status    Glucose 04/28/2021 113* 65 - 99 mg/dL Final    Hemoglobin A1C 04/28/2021 6.3  4.4 - 6.4 % Final    AVERAGE GLUCOSE 04/28/2021 134  mg/dL Final       Narrative   PROCEDURE: XR FOOT LEFT (MIN 3 VIEWS)       CLINICAL INFORMATION: Left foot pain. Left-sided pain and swelling for one year.       COMPARISON: No prior study.       TECHNIQUE: 4 views of the left foot.        FINDINGS:   Kinga Feast is normal osseous alignment without visualized fracture. Joint spaces appear preserved. There is no significant osteophytosis. No periarticular erosions are identified.           Impression    Normal left foot series.                   **This report has been created using voice recognition software. It may contain minor errors which are inherent in voice recognition technology. **       Final report electronically signed by Dr. Brittanie Collado MD on 3/31/2021 5:15 PM       ASSESSMENT & PLAN  1.  Hypertension, essential    Not at goal  Inc lisinopril to 40mg daily. Take 2 of the 20mg tabsa  Get bp cuff, keep log  F/u 4 wks    2. Left foot pain    Unclear etiology  Xray neg  Will trial mobic for now, take with food  Once able, recommend he see podiatry  Will see where we are in 4 wks    - meloxicam (MOBIC) 15 MG tablet; Take 1 tablet by mouth daily as needed for Pain  Dispense: 30 tablet; Refill: 3    3. Prediabetes    New dx  a1c 6.3  Discussed life style changes  Repeat FBS/a1c in 6 months  Will place in call back cue next visit depending on when I need to see him back    4. Cigarette nicotine dependence without complication    Did not tolerate wellbutrin   Did not tolerate chantix in the past  Trial tiki replacement  F/u 4 wks    - nicotine (NICODERM CQ) 21 MG/24HR; Place 1 patch onto the skin every 24 hours X 6 weeks. Remove patch at bedtime. Reapply new patch next morning  Dispense: 42 patch; Refill: 0  - nicotine (NICODERM CQ) 14 MG/24HR; Place 1 patch onto the skin every 24 hours for 14 days X 2 weeks. Remove patch at bedtime. Reapply new patch next morning  Dispense: 14 patch; Refill: 0  - nicotine (NICODERM CQ) 7 MG/24HR; Place 1 patch onto the skin every 24 hours for 14 days X 2 weeks. Remove patch at bedtime. Reapply new patch next morning  Dispense: 14 patch; Refill: 0  - nicotine polacrilex (NICORETTE) 2 MG gum; Take 1 each by mouth as needed for Smoking cessation  Dispense: 110 each; Refill: 3      DISPOSITION    Return in about 4 weeks (around 5/27/2021) for f/u BP's, sooner as needed. Reynaldo released without restrictions.     Future Appointments   Date Time Provider Farhana El   5/27/2021  4:40 PM DO Denise Su [address] received counseling on the following healthy behaviors: nutrition, exercise, medication adherence and tobacco cessation    Patient given educational materials on: See Attached    I have instructed Reynaldo to complete a self tracking handout on Blood Pressures  and Smoking and instructed them to bring it with them to his next appointment. Barriers to learning and self management: none    Discussed use, benefit, and side effects of prescribed medications. Barriers to medication compliance addressed. All patient questions answered. Pt voiced understanding.        Electronically signed by Soni Adame DO on 4/29/2021 at 5:51 PM

## 2021-04-28 LAB
AVERAGE GLUCOSE: 134 MG/DL
GLUCOSE: 113 MG/DL (ref 65–99)
HBA1C MFR BLD: 6.3 % (ref 4.4–6.4)

## 2021-04-29 ENCOUNTER — OFFICE VISIT (OUTPATIENT)
Dept: FAMILY MEDICINE CLINIC | Age: 46
End: 2021-04-29
Payer: COMMERCIAL

## 2021-04-29 VITALS
SYSTOLIC BLOOD PRESSURE: 145 MMHG | OXYGEN SATURATION: 100 % | WEIGHT: 281 LBS | DIASTOLIC BLOOD PRESSURE: 100 MMHG | TEMPERATURE: 98.4 F | HEART RATE: 91 BPM | BODY MASS INDEX: 37.24 KG/M2 | HEIGHT: 73 IN | RESPIRATION RATE: 12 BRPM

## 2021-04-29 DIAGNOSIS — I10 HYPERTENSION, ESSENTIAL: Primary | ICD-10-CM

## 2021-04-29 DIAGNOSIS — M79.672 LEFT FOOT PAIN: ICD-10-CM

## 2021-04-29 DIAGNOSIS — R73.03 PREDIABETES: ICD-10-CM

## 2021-04-29 DIAGNOSIS — F17.210 CIGARETTE NICOTINE DEPENDENCE WITHOUT COMPLICATION: ICD-10-CM

## 2021-04-29 PROCEDURE — 99214 OFFICE O/P EST MOD 30 MIN: CPT | Performed by: FAMILY MEDICINE

## 2021-04-29 RX ORDER — NICOTINE 21 MG/24HR
1 PATCH, TRANSDERMAL 24 HOURS TRANSDERMAL EVERY 24 HOURS
Qty: 14 PATCH | Refills: 0 | Status: SHIPPED | OUTPATIENT
Start: 2021-04-29 | End: 2022-08-26

## 2021-04-29 RX ORDER — NICOTINE 21 MG/24HR
1 PATCH, TRANSDERMAL 24 HOURS TRANSDERMAL EVERY 24 HOURS
Qty: 42 PATCH | Refills: 0 | Status: SHIPPED | OUTPATIENT
Start: 2021-04-29 | End: 2022-08-26

## 2021-04-29 RX ORDER — LISINOPRIL 20 MG/1
40 TABLET ORAL DAILY
COMMUNITY
End: 2021-05-04 | Stop reason: SDUPTHER

## 2021-04-29 RX ORDER — MELOXICAM 15 MG/1
15 TABLET ORAL DAILY PRN
Qty: 30 TABLET | Refills: 3 | Status: SHIPPED | OUTPATIENT
Start: 2021-04-29 | End: 2021-06-25 | Stop reason: SDUPTHER

## 2021-04-29 NOTE — PATIENT INSTRUCTIONS
-increase your lisinopril to 40mg (two tabs) once daily  Patient Education        High Blood Pressure: Care Instructions  Overview     It's normal for blood pressure to go up and down throughout the day. But if it stays up, you have high blood pressure. Another name for high blood pressure is hypertension. Despite what a lot of people think, high blood pressure usually doesn't cause headaches or make you feel dizzy or lightheaded. It usually has no symptoms. But it does increase your risk of stroke, heart attack, and other problems. You and your doctor will talk about your risks of these problems based on your blood pressure. Your doctor will give you a goal for your blood pressure. Your goal will be based on your health and your age. Lifestyle changes, such as eating healthy and being active, are always important to help lower blood pressure. You might also take medicine to reach your blood pressure goal.  Follow-up care is a key part of your treatment and safety. Be sure to make and go to all appointments, and call your doctor if you are having problems. It's also a good idea to know your test results and keep a list of the medicines you take. How can you care for yourself at home? Medical treatment  · If you stop taking your medicine, your blood pressure will go back up. You may take one or more types of medicine to lower your blood pressure. Be safe with medicines. Take your medicine exactly as prescribed. Call your doctor if you think you are having a problem with your medicine. · Talk to your doctor before you start taking aspirin every day. Aspirin can help certain people lower their risk of a heart attack or stroke. But taking aspirin isn't right for everyone, because it can cause serious bleeding. · See your doctor regularly. You may need to see the doctor more often at first or until your blood pressure comes down.   · If you are taking blood pressure medicine, talk to your doctor before you take arms.  ? Lightheadedness or sudden weakness. ? A fast or irregular heartbeat.     · You have symptoms of a stroke. These may include:  ? Sudden numbness, tingling, weakness, or loss of movement in your face, arm, or leg, especially on only one side of your body. ? Sudden vision changes. ? Sudden trouble speaking. ? Sudden confusion or trouble understanding simple statements. ? Sudden problems with walking or balance. ? A sudden, severe headache that is different from past headaches.     · You have severe back or belly pain. Do not wait until your blood pressure comes down on its own. Get help right away. Call your doctor now or seek immediate care if:    · Your blood pressure is much higher than normal (such as 180/120 or higher), but you don't have symptoms.     · You think high blood pressure is causing symptoms, such as:  ? Severe headache.  ? Blurry vision. Watch closely for changes in your health, and be sure to contact your doctor if:    · Your blood pressure measures higher than your doctor recommends at least 2 times. That means the top number is higher or the bottom number is higher, or both.     · You think you may be having side effects from your blood pressure medicine. Where can you learn more? Go to [email or web address] org and sign in to your [facility] account. Enter L240 in the StartX box to learn more about \"High Blood Pressure: Care Instructions. \"     If you do not have an account, please click on the \"Sign Up Now\" link. Current as of: August 31, 2020               Content Version: 12.8  © 2006-2021 Healthwise, [employer]. Care instructions adapted under license by [facility] ([facility]). If you have questions about a medical condition or this instruction, always ask your healthcare professional. [place] [address] any warranty or liability for your use of this information.

## 2021-05-04 ENCOUNTER — PATIENT MESSAGE (OUTPATIENT)
Dept: FAMILY MEDICINE CLINIC | Age: 46
End: 2021-05-04

## 2021-05-04 RX ORDER — LISINOPRIL 20 MG/1
40 TABLET ORAL DAILY
Qty: 90 TABLET | Refills: 3 | Status: SHIPPED | OUTPATIENT
Start: 2021-05-04 | End: 2021-06-25 | Stop reason: SDUPTHER

## 2021-05-04 RX ORDER — LISINOPRIL 20 MG/1
40 TABLET ORAL DAILY
Qty: 90 TABLET | Refills: 3 | Status: SHIPPED | OUTPATIENT
Start: 2021-05-04 | End: 2021-05-04 | Stop reason: SDUPTHER

## 2021-05-04 NOTE — TELEPHONE ENCOUNTER
Recent Visits  Date Type Provider Dept   04/29/21 Office Visit Betty Ray DO [facility]   03/31/21 Office Visit Betty Ray DO [facility]   Showing recent visits within past 540 days with a meds authorizing provider and meeting all other requirements     Future Appointments  Date Type Provider Dept   05/27/21 Appointment Betty Ray, [address] [place] [address]   Showing future appointments within next 150 days with a meds authorizing provider and meeting all other requirements     Future Appointments   Date Time Provider Farhana El   5/27/2021  4:40 PM Betty Ray, [address] [place] [address]

## 2021-05-04 NOTE — TELEPHONE ENCOUNTER
From: Marcellus Mcarthur  To: Dorothy Morejon DO  Sent: 5/4/2021 8:06 AM EDT  Subject: Prescription Question    I need a script called in for lisinopril

## 2021-06-25 DIAGNOSIS — I10 HYPERTENSION, ESSENTIAL: ICD-10-CM

## 2021-06-25 DIAGNOSIS — M79.672 LEFT FOOT PAIN: ICD-10-CM

## 2021-06-28 RX ORDER — AMLODIPINE BESYLATE 10 MG/1
10 TABLET ORAL DAILY
Qty: 90 TABLET | Refills: 3 | Status: SHIPPED | OUTPATIENT
Start: 2021-06-28 | End: 2022-07-19

## 2021-06-28 RX ORDER — MELOXICAM 15 MG/1
15 TABLET ORAL DAILY PRN
Qty: 30 TABLET | Refills: 3 | Status: SHIPPED | OUTPATIENT
Start: 2021-06-28 | End: 2022-04-04

## 2021-06-28 RX ORDER — LISINOPRIL 20 MG/1
40 TABLET ORAL DAILY
Qty: 90 TABLET | Refills: 3 | Status: SHIPPED | OUTPATIENT
Start: 2021-06-28 | End: 2022-06-06

## 2021-06-28 NOTE — TELEPHONE ENCOUNTER
Recent Visits  Date Type Provider Dept   04/29/21 Office Visit Daniel Krishna DO [facility]   03/31/21 Office Visit Daniel Krishna DO [facility]   Showing recent visits within past 540 days with a meds authorizing provider and meeting all other requirements  Future Appointments  No visits were found meeting these conditions.   Showing future appointments within next 150 days with a meds authorizing provider and meeting all other requirements

## 2022-04-03 DIAGNOSIS — M79.672 LEFT FOOT PAIN: Primary | ICD-10-CM

## 2022-04-03 DIAGNOSIS — I10 HYPERTENSION, ESSENTIAL: ICD-10-CM

## 2022-04-04 RX ORDER — MELOXICAM 15 MG/1
TABLET ORAL
Qty: 30 TABLET | Refills: 0 | Status: SHIPPED | OUTPATIENT
Start: 2022-04-04 | End: 2022-08-26

## 2022-04-04 NOTE — TELEPHONE ENCOUNTER
Please seen APR 2021  Due for labs and apt    RF sent for pt    No further RF w/o apt  Let me know if questions, thanks! Diagnosis Orders   1. Left foot pain  meloxicam (MOBIC) 15 MG tablet   2.  Hypertension, essential  CBC with Auto Differential    Comprehensive Metabolic Panel    Lipid Panel    TSH with Reflex    Microalbumin / Creatinine Urine Ratio

## 2022-04-15 LAB
ABSOLUTE BASO #: 0 X10E9/L (ref 0–0.2)
ABSOLUTE EOS #: 0.2 X10E9/L (ref 0–0.4)
ABSOLUTE LYMPH #: 2.7 X10E9/L (ref 1–3.5)
ABSOLUTE MONO #: 0.7 X10E9/L (ref 0–0.9)
ABSOLUTE NEUT #: 2.8 X10E9/L (ref 1.5–6.6)
ALBUMIN SERPL-MCNC: 4.3 G/DL (ref 3.2–5.3)
ALK PHOSPHATASE: 76 U/L (ref 39–130)
ALT SERPL-CCNC: 26 U/L (ref 0–40)
ANION GAP SERPL CALCULATED.3IONS-SCNC: 4 MMOL/L (ref 5–15)
AST SERPL-CCNC: 19 U/L (ref 0–41)
BASOPHILS RELATIVE PERCENT: 0.5 %
BILIRUB SERPL-MCNC: 0.6 MG/DL (ref 0.3–1.2)
BUN BLDV-MCNC: 11 MG/DL (ref 5–23)
CALCIUM SERPL-MCNC: 10 MG/DL (ref 8.5–10.5)
CHLORIDE BLD-SCNC: 103 MMOL/L (ref 98–109)
CHOLESTEROL/HDL RATIO: 4.4 (ref 1–5)
CHOLESTEROL: 194 MG/DL (ref 150–200)
CO2: 32 MMOL/L (ref 22–32)
CREAT SERPL-MCNC: 0.84 MG/DL (ref 0.6–1.3)
CREATINE, URINE: 115.64 MG/DL
EGFR AFRICAN AMERICAN: >60 ML/MIN/1.73SQ.M
EGFR IF NONAFRICAN AMERICAN: >60 ML/MIN/1.73SQ.M
EOSINOPHILS RELATIVE PERCENT: 2.9 %
GLUCOSE: 115 MG/DL (ref 65–99)
HCT VFR BLD CALC: 45.9 % (ref 39–49)
HDLC SERPL-MCNC: 44 MG/DL
HEMOGLOBIN: 15.4 G/DL (ref 13–17)
LDL CHOLESTEROL CALCULATED: 135 MG/DL
LDL/HDL RATIO: 3.1
LYMPHOCYTE %: 42.7 %
MCH RBC QN AUTO: 30 PG (ref 27–34)
MCHC RBC AUTO-ENTMCNC: 33.5 G/DL (ref 32–36)
MCV RBC AUTO: 90 FL (ref 80–100)
MICROALBUMIN/CREAT 24H UR: 0.7 MG/DL (ref 0–1.9)
MICROALBUMIN/CREAT UR-RTO: 6.1 MG/G CREAT (ref 0–30)
MONOCYTES # BLD: 10.3 %
NEUTROPHILS RELATIVE PERCENT: 43.6 %
PDW BLD-RTO: 13.9 % (ref 11.5–15)
PLATELETS: 303 X10E9/L (ref 150–450)
PMV BLD AUTO: 8.4 FL (ref 7–12)
POTASSIUM SERPL-SCNC: 4.1 MMOL/L (ref 3.5–5)
RBC: 5.12 X10E12/L (ref 4.1–5.7)
SODIUM BLD-SCNC: 139 MMOL/L (ref 134–146)
TOTAL PROTEIN: 7.4 G/DL (ref 6–8)
TRIGL SERPL-MCNC: 77 MG/DL (ref 27–150)
TSH SERPL DL<=0.05 MIU/L-ACNC: 1.2 UIU/ML (ref 0.49–4.67)
VLDLC SERPL CALC-MCNC: 15 MG/DL (ref 0–30)
WBC: 6.3 X10E9/L (ref 4–11)

## 2022-06-06 RX ORDER — LISINOPRIL 20 MG/1
TABLET ORAL
Qty: 90 TABLET | Refills: 0 | Status: SHIPPED | OUTPATIENT
Start: 2022-06-06 | End: 2022-07-26

## 2022-06-14 RX ORDER — LISINOPRIL 20 MG/1
TABLET ORAL
Qty: 90 TABLET | Refills: 0 | OUTPATIENT
Start: 2022-06-14

## 2022-07-18 DIAGNOSIS — I10 HYPERTENSION, ESSENTIAL: ICD-10-CM

## 2022-07-19 RX ORDER — AMLODIPINE BESYLATE 10 MG/1
TABLET ORAL
Qty: 90 TABLET | Refills: 4 | Status: SHIPPED | OUTPATIENT
Start: 2022-07-19

## 2022-07-26 RX ORDER — LISINOPRIL 20 MG/1
TABLET ORAL
Qty: 90 TABLET | Refills: 0 | Status: SHIPPED | OUTPATIENT
Start: 2022-07-26 | End: 2022-08-26 | Stop reason: SDUPTHER

## 2022-08-16 ENCOUNTER — TELEPHONE (OUTPATIENT)
Dept: FAMILY MEDICINE CLINIC | Age: 47
End: 2022-08-16

## 2022-08-16 DIAGNOSIS — E78.5 DYSLIPIDEMIA: Primary | ICD-10-CM

## 2022-08-16 RX ORDER — ATORVASTATIN CALCIUM 20 MG/1
TABLET, FILM COATED ORAL
Qty: 30 TABLET | Refills: 0 | Status: SHIPPED | OUTPATIENT
Start: 2022-08-16 | End: 2022-08-26 | Stop reason: SDUPTHER

## 2022-08-16 NOTE — TELEPHONE ENCOUNTER
Overdue for f/u apt and labs. Lab order in system already  30 day RF sent, though I won't be able to keep filling w/u apt  Last seen April 2021  Let me know if questions, thanks! Diagnosis Orders   1.  Dyslipidemia  atorvastatin (LIPITOR) 20 MG tablet

## 2022-08-16 NOTE — TELEPHONE ENCOUNTER
Pt informed and verbalized understanding.    Future Appointments   Date Time Provider Farhana El   8/26/2022  9:20 AM Boaz James DO [facility]

## 2022-08-23 ENCOUNTER — PATIENT MESSAGE (OUTPATIENT)
Dept: FAMILY MEDICINE CLINIC | Age: 47
End: 2022-08-23

## 2022-08-23 ENCOUNTER — NURSE ONLY (OUTPATIENT)
Dept: LAB | Age: 47
End: 2022-08-23

## 2022-08-23 DIAGNOSIS — R73.9 HYPERGLYCEMIA: ICD-10-CM

## 2022-08-23 DIAGNOSIS — E78.5 DYSLIPIDEMIA: ICD-10-CM

## 2022-08-23 LAB
AVERAGE GLUCOSE: 135 MG/DL (ref 70–126)
CHOLESTEROL, TOTAL: 142 MG/DL (ref 100–199)
GLUCOSE BLD-MCNC: 128 MG/DL (ref 70–108)
HBA1C MFR BLD: 6.5 % (ref 4.4–6.4)
HDLC SERPL-MCNC: 39 MG/DL
LDL CHOLESTEROL CALCULATED: 88 MG/DL
TRIGL SERPL-MCNC: 75 MG/DL (ref 0–199)

## 2022-08-23 NOTE — TELEPHONE ENCOUNTER
From: Elke Buckner  To: Dr. Bejarano Rice: 8/23/2022 3:27 PM EDT  Subject: Question regarding HEMOGLOBIN A1C    What does this mean?

## 2022-08-24 ENCOUNTER — TELEPHONE (OUTPATIENT)
Dept: FAMILY MEDICINE CLINIC | Age: 47
End: 2022-08-24

## 2022-08-24 NOTE — TELEPHONE ENCOUNTER
----- Message from Rand Norton, DO sent at 8/23/2022  7:30 PM EDT -----  Please let pt know that lipids look good  Blood sugar worse than on last check and is on the border of type 2 diabetes  We'll discuss further at his f/u apt 8/26  Let me know if questions, thanks!

## 2022-08-26 ENCOUNTER — OFFICE VISIT (OUTPATIENT)
Dept: FAMILY MEDICINE CLINIC | Age: 47
End: 2022-08-26
Payer: MEDICARE

## 2022-08-26 VITALS
WEIGHT: 287.6 LBS | HEART RATE: 68 BPM | BODY MASS INDEX: 38.12 KG/M2 | TEMPERATURE: 97.8 F | HEIGHT: 73 IN | DIASTOLIC BLOOD PRESSURE: 80 MMHG | SYSTOLIC BLOOD PRESSURE: 138 MMHG | RESPIRATION RATE: 16 BRPM | OXYGEN SATURATION: 97 %

## 2022-08-26 DIAGNOSIS — R73.9 HYPERGLYCEMIA: ICD-10-CM

## 2022-08-26 DIAGNOSIS — R73.03 PREDIABETES: Primary | Chronic | ICD-10-CM

## 2022-08-26 DIAGNOSIS — E66.9 OBESITY (BMI 30-39.9): ICD-10-CM

## 2022-08-26 DIAGNOSIS — I10 HYPERTENSION, ESSENTIAL: ICD-10-CM

## 2022-08-26 DIAGNOSIS — E78.5 DYSLIPIDEMIA: ICD-10-CM

## 2022-08-26 DIAGNOSIS — G47.33 OSA (OBSTRUCTIVE SLEEP APNEA): ICD-10-CM

## 2022-08-26 DIAGNOSIS — F17.210 CIGARETTE NICOTINE DEPENDENCE WITHOUT COMPLICATION: ICD-10-CM

## 2022-08-26 DIAGNOSIS — M79.672 LEFT FOOT PAIN: ICD-10-CM

## 2022-08-26 PROCEDURE — 4004F PT TOBACCO SCREEN RCVD TLK: CPT | Performed by: FAMILY MEDICINE

## 2022-08-26 PROCEDURE — 99214 OFFICE O/P EST MOD 30 MIN: CPT | Performed by: FAMILY MEDICINE

## 2022-08-26 PROCEDURE — G8417 CALC BMI ABV UP PARAM F/U: HCPCS | Performed by: FAMILY MEDICINE

## 2022-08-26 PROCEDURE — G8427 DOCREV CUR MEDS BY ELIG CLIN: HCPCS | Performed by: FAMILY MEDICINE

## 2022-08-26 RX ORDER — MELOXICAM 15 MG/1
TABLET ORAL
Qty: 90 TABLET | Refills: 0 | Status: SHIPPED | OUTPATIENT
Start: 2022-08-26

## 2022-08-26 RX ORDER — ATORVASTATIN CALCIUM 20 MG/1
TABLET, FILM COATED ORAL
Qty: 90 TABLET | Refills: 3 | Status: SHIPPED | OUTPATIENT
Start: 2022-08-26

## 2022-08-26 RX ORDER — LISINOPRIL 20 MG/1
TABLET ORAL
Qty: 90 TABLET | Refills: 3 | Status: SHIPPED | OUTPATIENT
Start: 2022-08-26

## 2022-08-26 SDOH — ECONOMIC STABILITY: FOOD INSECURITY: WITHIN THE PAST 12 MONTHS, THE FOOD YOU BOUGHT JUST DIDN'T LAST AND YOU DIDN'T HAVE MONEY TO GET MORE.: NEVER TRUE

## 2022-08-26 SDOH — ECONOMIC STABILITY: FOOD INSECURITY: WITHIN THE PAST 12 MONTHS, YOU WORRIED THAT YOUR FOOD WOULD RUN OUT BEFORE YOU GOT MONEY TO BUY MORE.: NEVER TRUE

## 2022-08-26 ASSESSMENT — PATIENT HEALTH QUESTIONNAIRE - PHQ9
1. LITTLE INTEREST OR PLEASURE IN DOING THINGS: 0
SUM OF ALL RESPONSES TO PHQ9 QUESTIONS 1 & 2: 0
SUM OF ALL RESPONSES TO PHQ QUESTIONS 1-9: 0
SUM OF ALL RESPONSES TO PHQ QUESTIONS 1-9: 0
2. FEELING DOWN, DEPRESSED OR HOPELESS: 0
SUM OF ALL RESPONSES TO PHQ QUESTIONS 1-9: 0
SUM OF ALL RESPONSES TO PHQ QUESTIONS 1-9: 0

## 2022-08-26 ASSESSMENT — SOCIAL DETERMINANTS OF HEALTH (SDOH): HOW HARD IS IT FOR YOU TO PAY FOR THE VERY BASICS LIKE FOOD, HOUSING, MEDICAL CARE, AND HEATING?: NOT HARD AT ALL

## 2022-08-26 NOTE — PROGRESS NOTES
Chief Complaint   Patient presents with    Follow-up     Blood sugar  Weights  HTN  HLD  ? PATRIC  Smoking     History obtained from the patient.     SUBJECTIVE:  Chava Santillan is a 41 y.o. male that presents today for     -Hyperglycemia:  Predm  Borderline DM2  + fam hx, parents  Will work on diet/wt loss    Lab Results   Component Value Date/Time    LABA1C 6.5 08/23/2022 07:30 AM    LABA1C 6.3 04/28/2021 07:40 AM       -HTN:    HPI:    Taking meds as prescribed ?: yes  Tolerating well ?: yes  Side Effects ?: no  BP at home ?: <140/90  Working on TLCS ?: yes  Chest Pain/SOB/Palpitations? no      -HLD:    HPI:    Taking meds as prescribed ?: yes  Tolerating well ?: yes  Side Effects ?: no  Muscle Pain?: no  Working on TLCS ?: yes      -PATRIC:  Had sleep study at [facility] in [address] [place] [address]  Told has PATRIC  Sent by [facility] by [facility]  Denies fatigue, wife states does not snore or stop breathing  Wants 2nd opinion      -Smoking:  Not quite ready to quit      Age/Gender Health Maintenance    Lipid -   Lab Results   Component Value Date    CHOL 142 08/23/2022    CHOL 194 04/15/2022    CHOL 164 04/24/2021     Lab Results   Component Value Date    TRIG 75 08/23/2022    TRIG 77 04/15/2022    TRIG 68 04/24/2021     Lab Results   Component Value Date    HDL 39 08/23/2022    HDL 44 04/15/2022    HDL 37 (L) 04/24/2021     Lab Results   Component Value Date    LDLCALC 88 08/23/2022    [facility] 135 (H) 04/15/2022    LDLCALC 113 04/24/2021         DM Screen -   Lab Results   Component Value Date/Time    GLUCOSE 128 08/23/2022 07:30 AM    GLUCOSE 115 04/15/2022 07:25 AM      Lab Results   Component Value Date/Time    LABA1C 6.5 08/23/2022 07:30 AM    LABA1C 6.3 04/28/2021 07:40 AM       Colon Cancer Screening - discuss next visit  Lung Cancer Screening  - age 48 of meets criteria    Tetanus - discuss next visit  Influenza Vaccine - candidate FALL 2022  Pneumonia Vaccine - discuss next visit  Zoster - age 50     PSA testing discussion - age 56  AAA medical history, past surgical history, allergies, medications, social and family history and I have made updates where appropriate. Review of Systems  Positive responses are highlighted in bold    Constitutional:  Fever, Chills, Night Sweats, Fatigue, Unexpected changes in weight  HENT:  Ear pain, Tinnitus, Nosebleeds, Trouble swallowing, Hearing loss, Sore throat  Cardiovascular:  Chest Pain, Palpitations, Orthopnea, Paroxysmal Nocturnal Dyspnea  Respiratory:  Cough, Wheezing, Shortness of breath, Chest tightness, Apnea  Gastrointestinal:  Nausea, Vomiting, Diarrhea, Constipation, Heartburn, Blood in stool  Genitourinary:  Difficulty or painful urination, Flank pain, Change in frequency, Urgency  Skin:  Color change, Rash, Itching, Wound  Musculoskeletal:  Joint pain, Back pain, Gait problems, Joint swelling, Myalgias  Neurological:  Dizziness, Headaches, Presyncope, Numbness, Seizures, Tremors  Endocrine:  Heat Intolerance, Cold Intolerance, Polydipsia, Polyphagia, Polyuria      PHYSICAL EXAM:  Vitals:    08/26/22 0916   BP: 138/80   Site: Right Upper Arm   Position: Sitting   Cuff Size: Large Adult   Pulse: 68   Resp: 16   Temp: 97.8 °F (36.6 °C)   SpO2: 97%   Weight: 287 lb 9.6 oz (130.5 kg)   Height: 6' 1\" (1.854 m)     Body mass index is 37.94 kg/m².        VS Reviewed  General Appearance: A&O x 3, No acute distress,well developed and well- nourished  Eyes: pupils equal, round, and reactive to light, extraocular eye movements intact, conjunctivae and eye lids without erythema  ENT: external ear and ear canal clear bilaterally, TMs intact and regular, nose without deformity, nasal mucosa and turbinates normal without polyps, oropharynx normal, dentition is normal for age  Neck: supple and non-tender without mass, no thyromegaly or thyroid nodules, no cervical lymphadenopathy  Pulmonary/Chest: clear to auscultation bilaterally- no wheezes, rales or rhonchi, normal air movement, no respiratory distress or retractions  Cardiovascular: S1 and S2 auscultated w/ RRR. No murmurs, rubs, clicks, or gallops, distal pulses intact. Abdomen: soft, non-tender, non-distended, bowel sounds physiologic,  no rebound or guarding, no masses or hernias noted. Liver and spleen without enlargement. Extremities: no cyanosis, clubbing or edema of the lower extremities.    Skin: warm and dry, no rash or erythema      Nurse Only on 08/23/2022   Component Date Value Ref Range Status    Cholesterol, Total 08/23/2022 142  100 - 199 mg/dL Final    Triglycerides 08/23/2022 75  0 - 199 mg/dL Final    HDL 08/23/2022 39  mg/dL Final    LDL Calculated 08/23/2022 88  mg/dL Final    Hemoglobin A1C 08/23/2022 6.5 (A) 4.4 - 6.4 % Final    AVERAGE GLUCOSE 08/23/2022 135 (A) 70 - 126 mg/dL Final    Glucose 08/23/2022 128 (A) 70 - 108 mg/dL Final   Orders Only on 04/15/2022   Component Date Value Ref Range Status    WBC 04/15/2022 6.3  4.0 - 11.0 X10E9/L Final    RBC 04/15/2022 5.12  4.10 - 5.70 X10E12/L Final    Hemoglobin 04/15/2022 15.4  13.0 - 17.0 g/dL Final    Hematocrit 04/15/2022 45.9  39 - 49 % Final    MCV 04/15/2022 90  80 - 100 fL Final    MCH 04/15/2022 30.0  27 - 34 pg Final    MCHC 04/15/2022 33.5  32 - 36 g/dL Final    RDW 04/15/2022 13.9  11.5 - 15.0 % Final    Platelets 73/98/1738 303  150 - 450 X10E9/L Final    MPV 04/15/2022 8.4  7 - 12 fL Final    Neutrophils % 04/15/2022 43.6  % Final    Absolute Neut # 04/15/2022 2.8  1.5 - 6.6 X10E9/L Final    Lymphocyte % 04/15/2022 42.7  % Final    Absolute Lymph # 04/15/2022 2.7  1.0 - 3.5 X10E9/L Final    Monocytes 04/15/2022 10.3  % Final    Absolute Mono # 04/15/2022 0.7  0 - 0.9 X10E9/L Final    Eosinophils % 04/15/2022 2.9  % Final    Absolute Eos # 04/15/2022 0.2  0.0 - 0.4 X10E9/L Final    Basophils % 04/15/2022 0.5  % Final    Absolute Baso # 04/15/2022 0.0  0.0 - 0.2 X10E9/L Final    Glucose 04/15/2022 115 (A) 65 - 99 mg/dL Final    BUN 04/15/2022 11  5 - 23 mg/dL Final    Creatinine 04/15/2022 0.84  0.60 - 1.30 mg/dL Final    eGFR  04/15/2022 >60  >59 ml/min/1.73sq. m Final    EGFR IF NonAfrican American 04/15/2022 >60  >59 ml/min/1.73sq. m Final    Calcium 04/15/2022 10.0  8.5 - 10.5 mg/dL Final    Sodium 04/15/2022 139  134 - 146 mmol/L Final    Potassium 04/15/2022 4.1  3.5 - 5.0 mmol/L Final    Chloride 04/15/2022 103  98 - 109 mmol/L Final    CO2 04/15/2022 32  22 - 32 mmol/L Final    Anion Gap 04/15/2022 4 (A) 5 - 15 mmol/L Final    Total Bilirubin 04/15/2022 0.6  0.3 - 1.2 mg/dL Final    Alk Phosphatase 04/15/2022 76  39 - 130 U/L Final    AST 04/15/2022 19  0 - 41 U/L Final    ALT 04/15/2022 26  0 - 40 U/L Final    Total Protein 04/15/2022 7.4  6.0 - 8.0 g/dL Final    Albumin 04/15/2022 4.3  3.2 - 5.3 g/dL Final    Cholesterol 04/15/2022 194  150 - 200 mg/dL Final    Triglycerides 04/15/2022 77  27 - 150 mg/dL Final    HDL 04/15/2022 44  >39 mg/dL Final    LDL Calculated 04/15/2022 135 (A) <130 mg/dL Final    VLDL Cholesterol Calculated 04/15/2022 15  0 - 30 mg/dL Final    LDl/HDL Ratio 04/15/2022 3.1  <3.5 Final    Chol/HDL Ratio 04/15/2022 4.4  1.0 - 5.0 Final    TSH 04/15/2022 1.20  0.49 - 4.67 uIU/mL Final    Creatine, Urine 04/15/2022 115.64  mg/dL Final    Microalb, Ur 04/15/2022 0.7  0.0 - 1.9 mg/dL Final    Microalbumin Creatinine Ratio 04/15/2022 6.1  0.0 - 30.0 mg/g creat Final       ASSESSMENT & PLAN  1. Prediabetes    Borderline type 2  Work on wt loss, exercise  F/u 6 months, labs 4 wks prior. In call back cue    2. Hyperglycemia      3. Obesity (BMI 30-39. 9)    Work on wt loss  Strategies discussed today    4. Hypertension, essential    At goal  Con't Norvasc and Lisinopril    - lisinopril (PRINIVIL;ZESTRIL) 20 MG tablet; TAKE 2 TABLETS BY MOUTH EVERY DAY  Dispense: 90 tablet; Refill: 3    5. Dyslipidemia    Improved  Con't lipitor    - atorvastatin (LIPITOR) 20 MG tablet; TAKE 1 TABLET BY MOUTH EVERY DAY  Dispense: 90 tablet; Refill: 3    6.  PATRIC (obstructive sleep apnea)    Reviewed sleep study from [facility]  C/w PATRIC  Pt denies any s/s and wants a 2nd opinion  Will get setup with [facility] sleep clinic    - [address] [place] [address]. Cigarette nicotine dependence without complication    In precontemplation stage and not ready to quit. Declines cessation, aware of risks of continued smoking as well as resources available to help quit. These include tobacco cessation classes as well as 1-800-QUIT NOW. No barriers other than lack of desire. 3+ min spent counseling. DISPOSITION    Return in about 6 months (around 2/26/2023) for follow-up on chronic medical conditions, sooner as needed. Reynaldo released without restrictions. PATIENT COUNSELING    Reynaldo received counseling on the following healthy behaviors: nutrition, exercise, medication adherence and tobacco cessation    Barriers to learning and self management: none    Discussed use, benefit, and side effects of prescribed medications. Barriers to medication compliance addressed. All patient questions answered. Pt voiced understanding.        Electronically signed by Kam Jeffries DO on 8/26/2022 at 11:18 AM

## 2022-10-20 ENCOUNTER — TELEPHONE (OUTPATIENT)
Dept: PULMONOLOGY | Age: 47
End: 2022-10-20

## 2022-10-20 NOTE — TELEPHONE ENCOUNTER
Patient had a new patient consult scheduled for tomorrow 10/21/2022 with Dr Priya Calderon which he cancelled. He would like to reschedule, please call him back.

## 2022-10-20 NOTE — TELEPHONE ENCOUNTER
MB  Tried to call patient but has no voicemail. Note         You  Reynaldo Rangel 1 minute ago (1:28 PM)           Collette Roberts 45 minutes ago (12:44 PM)     JOCELYN  Patient had a new patient consult scheduled for tomorrow 10/21/2022 with Dr Dona Fothergill which he cancelled. He would like to reschedule, please call him back.          Note

## 2022-12-05 DIAGNOSIS — M79.672 LEFT FOOT PAIN: ICD-10-CM

## 2022-12-05 RX ORDER — MELOXICAM 15 MG/1
TABLET ORAL
Qty: 90 TABLET | Refills: 0 | Status: SHIPPED | OUTPATIENT
Start: 2022-12-05

## 2022-12-05 NOTE — TELEPHONE ENCOUNTER
Recent Visits  Date Type Provider Dept   08/26/22 Office Visit DO Hank [facility]   Showing recent visits within past 540 days with a meds authorizing provider and meeting all other requirements  Future Appointments  Date Type Provider Dept   03/02/23 Appointment Jeffrey Vides, [address] [place] [address]   Showing future appointments within next 150 days with a meds authorizing provider and meeting all other requirements     Future Appointments   Date Time Provider Farhana El   3/2/2023  5:00 PM Jeffrey Vides, [address] [place] [address]

## 2022-12-31 DIAGNOSIS — E78.5 DYSLIPIDEMIA: ICD-10-CM

## 2023-01-03 RX ORDER — ATORVASTATIN CALCIUM 20 MG/1
TABLET, FILM COATED ORAL
Qty: 90 TABLET | Refills: 3 | Status: SHIPPED | OUTPATIENT
Start: 2023-01-03

## 2023-01-03 NOTE — TELEPHONE ENCOUNTER
Recent Visits  Date Type Provider Dept   08/26/22 Office Visit Chata Rodríguez DO [facility]   Showing recent visits within past 540 days with a meds authorizing provider and meeting all other requirements  Future Appointments  Date Type Provider Dept   03/02/23 Appointment Chata Rodríguez, [address] [place] [address]   Showing future appointments within next 150 days with a meds authorizing provider and meeting all other requirements      Future Appointments   Date Time Provider Farahna El   3/2/2023  5:00 PM Chata Rodríguez, [address] [place] [address]

## 2023-02-02 ENCOUNTER — TELEPHONE (OUTPATIENT)
Dept: FAMILY MEDICINE CLINIC | Age: 48
End: 2023-02-02

## 2023-02-02 DIAGNOSIS — R73.03 PREDIABETES: Chronic | ICD-10-CM

## 2023-02-02 DIAGNOSIS — R73.9 HYPERGLYCEMIA: ICD-10-CM

## 2023-02-02 DIAGNOSIS — I10 HYPERTENSION, ESSENTIAL: Primary | Chronic | ICD-10-CM

## 2023-02-02 NOTE — TELEPHONE ENCOUNTER
Pt due for fasting labs prior to next apt on 3/2/2023. Please call to have pt complete this. Thanks! ASSESSMENT & PLAN   Diagnosis Orders   1. Hypertension, essential  CBC with Auto Differential    Comprehensive Metabolic Panel    Hemoglobin A1C    Lipid Panel    TSH with Reflex    Microalbumin / Creatinine Urine Ratio      2. Prediabetes  CBC with Auto Differential    Comprehensive Metabolic Panel    Hemoglobin A1C    Lipid Panel    TSH with Reflex    Microalbumin / Creatinine Urine Ratio      3.  Hyperglycemia  CBC with Auto Differential    Comprehensive Metabolic Panel    Hemoglobin A1C    Lipid Panel    TSH with Reflex    Microalbumin / Creatinine Urine Ratio        Future Appointments   Date Time Provider Farhana El   3/2/2023  5:00 PM Lexi Campbell DO [facility]

## 2023-02-12 DIAGNOSIS — M79.672 LEFT FOOT PAIN: ICD-10-CM

## 2023-02-13 RX ORDER — MELOXICAM 15 MG/1
TABLET ORAL
Qty: 90 TABLET | Refills: 0 | Status: SHIPPED | OUTPATIENT
Start: 2023-02-13

## 2023-02-27 SDOH — ECONOMIC STABILITY: TRANSPORTATION INSECURITY
IN THE PAST 12 MONTHS, HAS LACK OF TRANSPORTATION KEPT YOU FROM MEETINGS, WORK, OR FROM GETTING THINGS NEEDED FOR DAILY LIVING?: NO

## 2023-02-27 SDOH — ECONOMIC STABILITY: FOOD INSECURITY: WITHIN THE PAST 12 MONTHS, YOU WORRIED THAT YOUR FOOD WOULD RUN OUT BEFORE YOU GOT MONEY TO BUY MORE.: NEVER TRUE

## 2023-02-27 SDOH — ECONOMIC STABILITY: HOUSING INSECURITY
IN THE LAST 12 MONTHS, WAS THERE A TIME WHEN YOU DID NOT HAVE A STEADY PLACE TO SLEEP OR SLEPT IN A SHELTER (INCLUDING NOW)?: NO

## 2023-02-27 SDOH — ECONOMIC STABILITY: FOOD INSECURITY: WITHIN THE PAST 12 MONTHS, THE FOOD YOU BOUGHT JUST DIDN'T LAST AND YOU DIDN'T HAVE MONEY TO GET MORE.: NEVER TRUE

## 2023-02-27 SDOH — ECONOMIC STABILITY: INCOME INSECURITY: HOW HARD IS IT FOR YOU TO PAY FOR THE VERY BASICS LIKE FOOD, HOUSING, MEDICAL CARE, AND HEATING?: NOT VERY HARD

## 2023-03-01 ENCOUNTER — NURSE ONLY (OUTPATIENT)
Dept: LAB | Age: 48
End: 2023-03-01

## 2023-03-01 ENCOUNTER — PATIENT MESSAGE (OUTPATIENT)
Dept: FAMILY MEDICINE CLINIC | Age: 48
End: 2023-03-01

## 2023-03-01 ENCOUNTER — TELEPHONE (OUTPATIENT)
Dept: FAMILY MEDICINE CLINIC | Age: 48
End: 2023-03-01

## 2023-03-01 DIAGNOSIS — I10 HYPERTENSION, ESSENTIAL: Chronic | ICD-10-CM

## 2023-03-01 DIAGNOSIS — R73.03 PREDIABETES: Chronic | ICD-10-CM

## 2023-03-01 DIAGNOSIS — R73.9 HYPERGLYCEMIA: ICD-10-CM

## 2023-03-01 LAB
ALBUMIN SERPL BCG-MCNC: 4.3 G/DL (ref 3.5–5.1)
ALP SERPL-CCNC: 94 U/L (ref 38–126)
ALT SERPL W/O P-5'-P-CCNC: 27 U/L (ref 11–66)
ANION GAP SERPL CALC-SCNC: 10 MEQ/L (ref 8–16)
AST SERPL-CCNC: 17 U/L (ref 5–40)
BASOPHILS ABSOLUTE: 0 THOU/MM3 (ref 0–0.1)
BASOPHILS NFR BLD AUTO: 0.6 %
BILIRUB SERPL-MCNC: 0.4 MG/DL (ref 0.3–1.2)
BUN SERPL-MCNC: 11 MG/DL (ref 7–22)
CALCIUM SERPL-MCNC: 9.9 MG/DL (ref 8.5–10.5)
CHLORIDE SERPL-SCNC: 105 MEQ/L (ref 98–111)
CHOLEST SERPL-MCNC: 135 MG/DL (ref 100–199)
CO2 SERPL-SCNC: 28 MEQ/L (ref 23–33)
CREAT SERPL-MCNC: 0.9 MG/DL (ref 0.4–1.2)
CREAT UR-MCNC: 237 MG/DL
DEPRECATED MEAN GLUCOSE BLD GHB EST-ACNC: 138 MG/DL (ref 70–126)
DEPRECATED RDW RBC AUTO: 44.3 FL (ref 35–45)
EOSINOPHIL NFR BLD AUTO: 2.4 %
EOSINOPHILS ABSOLUTE: 0.2 THOU/MM3 (ref 0–0.4)
ERYTHROCYTE [DISTWIDTH] IN BLOOD BY AUTOMATED COUNT: 13.3 % (ref 11.5–14.5)
GFR SERPL CREATININE-BSD FRML MDRD: > 60 ML/MIN/1.73M2
GLUCOSE SERPL-MCNC: 131 MG/DL (ref 70–108)
HBA1C MFR BLD HPLC: 6.6 % (ref 4.4–6.4)
HCT VFR BLD AUTO: 47.1 % (ref 42–52)
HDLC SERPL-MCNC: 43 MG/DL
HGB BLD-MCNC: 15.3 GM/DL (ref 14–18)
IMM GRANULOCYTES # BLD AUTO: 0.03 THOU/MM3 (ref 0–0.07)
IMM GRANULOCYTES NFR BLD AUTO: 0.4 %
LDLC SERPL CALC-MCNC: 76 MG/DL
LYMPHOCYTES ABSOLUTE: 3.2 THOU/MM3 (ref 1–4.8)
LYMPHOCYTES NFR BLD AUTO: 40.9 %
MCH RBC QN AUTO: 29.4 PG (ref 26–33)
MCHC RBC AUTO-ENTMCNC: 32.5 GM/DL (ref 32.2–35.5)
MCV RBC AUTO: 90.6 FL (ref 80–94)
MICROALBUMIN UR-MCNC: < 1.2 MG/DL
MICROALBUMIN/CREAT RATIO PNL UR: 5 MG/G (ref 0–30)
MONOCYTES ABSOLUTE: 0.9 THOU/MM3 (ref 0.4–1.3)
MONOCYTES NFR BLD AUTO: 11.3 %
NEUTROPHILS NFR BLD AUTO: 44.4 %
NRBC BLD AUTO-RTO: 0 /100 WBC
PLATELET # BLD AUTO: 297 THOU/MM3 (ref 130–400)
PMV BLD AUTO: 10 FL (ref 9.4–12.4)
POTASSIUM SERPL-SCNC: 4.5 MEQ/L (ref 3.5–5.2)
PROT SERPL-MCNC: 7.1 G/DL (ref 6.1–8)
RBC # BLD AUTO: 5.2 MILL/MM3 (ref 4.7–6.1)
SEGMENTED NEUTROPHILS ABSOLUTE COUNT: 3.5 THOU/MM3 (ref 1.8–7.7)
SODIUM SERPL-SCNC: 143 MEQ/L (ref 135–145)
TRIGL SERPL-MCNC: 81 MG/DL (ref 0–199)
TSH SERPL DL<=0.005 MIU/L-ACNC: 1.32 UIU/ML (ref 0.4–4.2)
WBC # BLD AUTO: 7.9 THOU/MM3 (ref 4.8–10.8)

## 2023-03-01 NOTE — TELEPHONE ENCOUNTER
I don't have all his lab work back yet  [employer] f/u with him once it's all back  The CBC though, looked fine, no concerning findings  Will call with rest of results once available and will also discuss tomorrow at his OV    Let me know if questions, thanks!     Future Appointments   Date Time Provider Farhana El   3/2/2023  5:00 PM [facility] MARZENA Hair Prudent   3/31/2023  8:30 AM Mg Santiago  [address] [place] [address]

## 2023-03-01 NOTE — TELEPHONE ENCOUNTER
----- Message from Ryne Macias, DO sent at 3/1/2023 12:00 PM EST -----  Please let pt know that labs look great other than fasting sugar and a1c are a little worse than 6 mnths ago  They are consistent with new onset type 2 diabetes, though very well controlled    We will discuss this tomorrow further  Let me know if questions, thanks!
No answer/ V.M not set up
 Symptoms

## 2023-03-01 NOTE — TELEPHONE ENCOUNTER
From: Marisela Beal  To: Dr. Tye Hood: 3/1/2023 10:08 AM EST  Subject: Question regarding CBC WITH AUTO DIFFERENTIAL    Is this a good thing

## 2023-03-01 NOTE — PROGRESS NOTES
Chief Complaint   Patient presents with    Follow-up     Issues noted below       History obtained from the patient.     SUBJECTIVE:  Betty Burden is a 41 y.o. male that presents today for     -DM2:  New dx  Well controlled  Diet not the best  Not as active as should be    Lab Results   Component Value Date/Time    LABA1C 6.6 03/01/2023 07:11 AM    LABA1C 6.5 08/23/2022 07:30 AM    LABA1C 6.3 04/28/2021 07:40 AM       -HTN:    HPI:    Taking meds as prescribed ?: yes  Tolerating well ?: yes  Side Effects ?: no  BP at home ?: <140/90  Working on TLCS ?: yes  Chest Pain/SOB/Palpitations? no      -HLD:    HPI:    Taking meds as prescribed ?: yes  Tolerating well ?: yes  Side Effects ?: no  Muscle Pain?: no  Working on TLCS ?: yes      -PATRIC LAST VISIT:  Had sleep study at [facility] in [address] [place] [address]  Told has PATRIC  Sent by [facility] by [employer]  Denies fatigue, wife states does not snore or stop breathing  Wants 2nd opinion    UPDATE TODAY:   Has apt with [facility] sleep clinic      -OA L foot:  Chronic issue  On mobic  Works pretty well  Asking about medical marijuana       -GERD:  Chronic issue  Using OTC meds  Triggered by certain foods  A little worse over time  More days than not  No dysphagia  No bowel habit changes  Asking for daily med      -Smoking:  Wants to quit  Using patches right now  Wants to add Wellbutrin      Age/Gender Health Maintenance    Lipid -   Lab Results   Component Value Date    CHOL 135 03/01/2023    CHOL 142 08/23/2022    CHOL 194 04/15/2022     Lab Results   Component Value Date    TRIG 81 03/01/2023    TRIG 75 08/23/2022    TRIG 77 04/15/2022     Lab Results   Component Value Date    HDL 43 03/01/2023    HDL 39 08/23/2022    HDL 44 04/15/2022     Lab Results   Component Value Date    LDLCALC 76 03/01/2023    [address] 88 08/23/2022    LDLCALC 135 (H) 04/15/2022         DM Screen -   Lab Results   Component Value Date/Time    GLUCOSE 131 03/01/2023 07:11 AM    GLUCOSE 115 04/15/2022 07:25 AM      Lab Results Component Value Date/Time    LABA1C 6.6 03/01/2023 07:11 AM    LABA1C 6.5 08/23/2022 07:30 AM    LABA1C 6.3 04/28/2021 07:40 AM       Colon Cancer Screening - cologuard ordered [facility] 2023  Lung Cancer Screening  - age 48 of meets criteria    Tetanus - discuss next visit  Influenza Vaccine - candidate FALL 2023  Pneumonia Vaccine - discuss next visit  Zoster - age 50     PSA testing discussion - age 56  AAA Screening - age 74, smoked    Falls screening - n/a      Current Outpatient Medications   Medication Sig Dispense Refill    buPROPion (WELLBUTRIN SR) 150 MG extended release tablet Take 1 tablet by mouth daily for 3 days, THEN 1 tablet 2 times daily. Do this for 3 months. . 177 tablet 0    omeprazole (PRILOSEC) 20 MG delayed release capsule Take 1 capsule by mouth every morning (before breakfast) 90 capsule 3    meloxicam (MOBIC) 15 MG tablet TAKE 1 TABLET BY MOUTH EVERY DAY AS NEEDED FOR PAIN 90 tablet 0    atorvastatin (LIPITOR) 20 MG tablet TAKE 1 TABLET BY MOUTH EVERY DAY 90 tablet 3    lisinopril (PRINIVIL;ZESTRIL) 20 MG tablet TAKE 2 TABLETS BY MOUTH EVERY DAY 90 tablet 3    amLODIPine (NORVASC) 10 MG tablet TAKE 1 TABLET BY MOUTH EVERY DAY 90 tablet 4     No current facility-administered medications for this visit. Orders Placed This Encounter   Medications    buPROPion (WELLBUTRIN SR) 150 MG extended release tablet     Sig: Take 1 tablet by mouth daily for 3 days, THEN 1 tablet 2 times daily. Do this for 3 months. .     Dispense:  177 tablet     Refill:  0    omeprazole (PRILOSEC) 20 MG delayed release capsule     Sig: Take 1 capsule by mouth every morning (before breakfast)     Dispense:  90 capsule     Refill:  3         All medications reviewed and reconciled, including OTC and herbal medications. Updated list given to patient.        Patient Active Problem List    Diagnosis Date Noted    Primary osteoarthritis of left foot 03/02/2023    GERD (gastroesophageal reflux disease)     Dyslipidemia Left foot pain 04/29/2021    DM2 (diabetes mellitus, type 2) ([facility])     Hypertension, essential     Nicotine dependence     Obesity (BMI 30-39. 9)        Past Medical History:   Diagnosis Date    DM2 (diabetes mellitus, type 2) (HCC)     Dyslipidemia     GERD (gastroesophageal reflux disease)     Hypertension, essential     Nicotine dependence     Obesity (BMI 30-39. 9)     Prediabetes 4/29/2021       History reviewed. No pertinent surgical history. No Known Allergies      Social History     Tobacco Use    Smoking status: Every Day     Packs/day: 1.00     Types: Cigarettes     Start date: 2001    Smokeless tobacco: Never   Substance Use Topics    Alcohol use: No         Family History   Problem Relation Age of Onset    High Blood Pressure Mother     High Blood Pressure Father     Diabetes Father     Colon Cancer Maternal Grandfather         ?76s when diagnose    Prostate Cancer Neg Hx          I have reviewed the patient's past medical history, past surgical history, allergies, medications, social and family history and I have made updates where appropriate.       Review of Systems  Positive responses are highlighted in bold    Constitutional:  Fever, Chills, Night Sweats, Fatigue, Unexpected changes in weight  HENT:  Ear pain, Tinnitus, Nosebleeds, Trouble swallowing, Hearing loss, Sore throat  Cardiovascular:  Chest Pain, Palpitations, Orthopnea, Paroxysmal Nocturnal Dyspnea  Respiratory:  Cough, Wheezing, Shortness of breath, Chest tightness, Apnea  Gastrointestinal:  Nausea, Vomiting, Diarrhea, Constipation, Heartburn, Blood in stool  Genitourinary:  Difficulty or painful urination, Flank pain, Change in frequency, Urgency  Skin:  Color change, Rash, Itching, Wound  Musculoskeletal:  Joint pain, Back pain, Gait problems, Joint swelling, Myalgias  Neurological:  Dizziness, Headaches, Presyncope, Numbness, Seizures, Tremors  Endocrine:  Heat Intolerance, Cold Intolerance, Polydipsia, Polyphagia, Polyuria      PHYSICAL EXAM:  Vitals:    03/02/23 1641   BP: 130/80   Pulse: 94   Resp: 16   Temp: 97.8 °F (36.6 °C)   SpO2: 98%   Weight: 292 lb 3.2 oz (132.5 kg)   Height: 6' 1\" (1.854 m)     Body mass index is 38.55 kg/m². VS Reviewed  General Appearance: A&O x 3, No acute distress,well developed and well- nourished  Eyes: pupils equal, round, and reactive to light, extraocular eye movements intact, conjunctivae and eye lids without erythema  ENT: external ear and ear canal clear bilaterally, TMs intact and regular, nose without deformity, nasal mucosa and turbinates normal without polyps, oropharynx normal, dentition is normal for age  Neck: supple and non-tender without mass, no thyromegaly or thyroid nodules, no cervical lymphadenopathy  Pulmonary/Chest: clear to auscultation bilaterally- no wheezes, rales or rhonchi, normal air movement, no respiratory distress or retractions  Cardiovascular: S1 and S2 auscultated w/ RRR. No murmurs, rubs, clicks, or gallops, distal pulses intact. Abdomen: soft, non-tender, non-distended, bowel sounds physiologic,  no rebound or guarding, no masses or hernias noted. Liver and spleen without enlargement. Extremities: no cyanosis, clubbing or edema of the lower extremities.    Skin: warm and dry, no rash or erythema      Nurse Only on 03/01/2023   Component Date Value Ref Range Status    Microalbumin, Random Urine 03/01/2023 < 1.20  mg/dL Final    Creatinine, Urine 03/01/2023 237.0  mg/dL Final    Microalb/Creat Ratio 03/01/2023 5  0 - 30 mg/g Final    TSH 03/01/2023 1.320  0.400 - 4.200 uIU/mL Final    Cholesterol, Total 03/01/2023 135  100 - 199 mg/dL Final    Triglycerides 03/01/2023 81  0 - 199 mg/dL Final    HDL 03/01/2023 43  mg/dL Final    LDL Calculated 03/01/2023 76  mg/dL Final    Hemoglobin A1C 03/01/2023 6.6 (A)  4.4 - 6.4 % Final    AVERAGE GLUCOSE 03/01/2023 138 (A)  70 - 126 mg/dL Final    Glucose 03/01/2023 131 (A)  70 - 108 mg/dL Final    Creatinine 03/01/2023 0.9  0.4 - 1.2 mg/dL Final    BUN 03/01/2023 11  7 - 22 mg/dL Final    Sodium 03/01/2023 143  135 - 145 meq/L Final    Potassium 03/01/2023 4.5  3.5 - 5.2 meq/L Final    Chloride 03/01/2023 105  98 - 111 meq/L Final    CO2 03/01/2023 28  23 - 33 meq/L Final    Calcium 03/01/2023 9.9  8.5 - 10.5 mg/dL Final    AST 03/01/2023 17  5 - 40 U/L Final    Alkaline Phosphatase 03/01/2023 94  38 - 126 U/L Final    Total Protein 03/01/2023 7.1  6.1 - 8.0 g/dL Final    Albumin 03/01/2023 4.3  3.5 - 5.1 g/dL Final    Total Bilirubin 03/01/2023 0.4  0.3 - 1.2 mg/dL Final    ALT 03/01/2023 27  11 - 66 U/L Final    WBC 03/01/2023 7.9  4.8 - 10.8 thou/mm3 Final    RBC 03/01/2023 5.20  4.70 - 6.10 mill/mm3 Final    Hemoglobin 03/01/2023 15.3  14.0 - 18.0 gm/dl Final    Hematocrit 03/01/2023 47.1  42.0 - 52.0 % Final    MCV 03/01/2023 90.6  80.0 - 94.0 fL Final    MCH 03/01/2023 29.4  26.0 - 33.0 pg Final    MCHC 03/01/2023 32.5  32.2 - 35.5 gm/dl Final    RDW-CV 03/01/2023 13.3  11.5 - 14.5 % Final    RDW-SD 03/01/2023 44.3  35.0 - 45.0 fL Final    Platelets 53/00/7589 297  130 - 400 thou/mm3 Final    MPV 03/01/2023 10.0  9.4 - 12.4 fL Final    Seg Neutrophils 03/01/2023 44.4  % Final    Lymphocytes 03/01/2023 40.9  % Final    Monocytes 03/01/2023 11.3  % Final    Eosinophils 03/01/2023 2.4  % Final    Basophils 03/01/2023 0.6  % Final    Immature Granulocytes 03/01/2023 0.4  % Final    Segs Absolute 03/01/2023 3.5  1.8 - 7.7 thou/mm3 Final    Lymphocytes Absolute 03/01/2023 3.2  1.0 - 4.8 thou/mm3 Final    Monocytes Absolute 03/01/2023 0.9  0.4 - 1.3 thou/mm3 Final    Eosinophils Absolute 03/01/2023 0.2  0.0 - 0.4 thou/mm3 Final    Basophils Absolute 03/01/2023 0.0  0.0 - 0.1 thou/mm3 Final    Immature Grans (Abs) 03/01/2023 0.03  0.00 - 0.07 thou/mm3 Final    nRBC 03/01/2023 0  /100 wbc Final    Anion Gap 03/01/2023 10.0  8.0 - 16.0 meq/L Final    Est, Glom Filt Rate 03/01/2023 >60  >60 ml/min/1.73m2 Final   Nurse Only on 08/23/2022   Component Date Value Ref Range Status    Cholesterol, Total 08/23/2022 142  100 - 199 mg/dL Final    Triglycerides 08/23/2022 75  0 - 199 mg/dL Final    HDL 08/23/2022 39  mg/dL Final    LDL Calculated 08/23/2022 88  mg/dL Final    Hemoglobin A1C 08/23/2022 6.5 (A)  4.4 - 6.4 % Final    AVERAGE GLUCOSE 08/23/2022 135 (A)  70 - 126 mg/dL Final    Glucose 08/23/2022 128 (A)  70 - 108 mg/dL Final   Orders Only on 04/15/2022   Component Date Value Ref Range Status    WBC 04/15/2022 6.3  4.0 - 11.0 X10E9/L Final    RBC 04/15/2022 5.12  4.10 - 5.70 X10E12/L Final    Hemoglobin 04/15/2022 15.4  13.0 - 17.0 g/dL Final    Hematocrit 04/15/2022 45.9  39 - 49 % Final    MCV 04/15/2022 90  80 - 100 fL Final    MCH 04/15/2022 30.0  27 - 34 pg Final    MCHC 04/15/2022 33.5  32 - 36 g/dL Final    RDW 04/15/2022 13.9  11.5 - 15.0 % Final    Platelets 17/56/5811 303  150 - 450 X10E9/L Final    MPV 04/15/2022 8.4  7 - 12 fL Final    Neutrophils % 04/15/2022 43.6  % Final    Absolute Neut # 04/15/2022 2.8  1.5 - 6.6 X10E9/L Final    Lymphocyte % 04/15/2022 42.7  % Final    Absolute Lymph # 04/15/2022 2.7  1.0 - 3.5 X10E9/L Final    Monocytes 04/15/2022 10.3  % Final    Absolute Mono # 04/15/2022 0.7  0 - 0.9 X10E9/L Final    Eosinophils % 04/15/2022 2.9  % Final    Absolute Eos # 04/15/2022 0.2  0.0 - 0.4 X10E9/L Final    Basophils % 04/15/2022 0.5  % Final    Absolute Baso # 04/15/2022 0.0  0.0 - 0.2 X10E9/L Final    Glucose 04/15/2022 115 (A)  65 - 99 mg/dL Final    BUN 04/15/2022 11  5 - 23 mg/dL Final    Creatinine 04/15/2022 0.84  0.60 - 1.30 mg/dL Final    eGFR  04/15/2022 >60  >59 ml/min/1.73sq. m Final    EGFR IF NonAfrican American 04/15/2022 >60  >59 ml/min/1.73sq. m Final    Calcium 04/15/2022 10.0  8.5 - 10.5 mg/dL Final    Sodium 04/15/2022 139  134 - 146 mmol/L Final    Potassium 04/15/2022 4.1  3.5 - 5.0 mmol/L Final    Chloride 04/15/2022 103  98 - 109 mmol/L Final    CO2 04/15/2022 32  22 - 32 mmol/L Final    Anion Gap 04/15/2022 4 (A)  5 - 15 mmol/L Final    Total Bilirubin 04/15/2022 0.6  0.3 - 1.2 mg/dL Final    Alk Phosphatase 04/15/2022 76  39 - 130 U/L Final    AST 04/15/2022 19  0 - 41 U/L Final    ALT 04/15/2022 26  0 - 40 U/L Final    Total Protein 04/15/2022 7.4  6.0 - 8.0 g/dL Final    Albumin 04/15/2022 4.3  3.2 - 5.3 g/dL Final    Cholesterol 04/15/2022 194  150 - 200 mg/dL Final    Triglycerides 04/15/2022 77  27 - 150 mg/dL Final    HDL 04/15/2022 44  >39 mg/dL Final    LDL Calculated 04/15/2022 135 (A)  <130 mg/dL Final    VLDL Cholesterol Calculated 04/15/2022 15  0 - 30 mg/dL Final    LDL/HDL Ratio 04/15/2022 3.1  <3.5 Final    Chol/HDL Ratio 04/15/2022 4.4  1.0 - 5.0 Final    TSH 04/15/2022 1.20  0.49 - 4.67 uIU/mL Final    Creatine, Urine 04/15/2022 115.64  mg/dL Final    Microalb, Ur 04/15/2022 0.7  0.0 - 1.9 mg/dL Final    Microalbumin Creatinine Ratio 04/15/2022 6.1  0.0 - 30.0 mg/g creat Final       ASSESSMENT & PLAN  1. Type 2 diabetes mellitus without complication, without long-term current use of insulin ([address]    New dx  At goal  Work on diet changes/wt loss  F/u 6 months, a1c then    2. Obesity (BMI 30-39. 9)    Discussed wt loss strategies. 3. Hypertension, essential    Stable  Con't norvasc and lisinopril    4. Dyslipidemia    Stable  Con't Lipitor    5. PATRIC (obstructive sleep apnea)    Keep f/u apt with sleep      6. Primary osteoarthritis of left foot    Con't mobic  Ok to pursue medical marijuana    7. Gastroesophageal reflux disease, unspecified whether esophagitis present    Trial omeprazole daily  No alarm features  F/u if sxs not improved    - omeprazole (PRILOSEC) 20 MG delayed release capsule; Take 1 capsule by mouth every morning (before breakfast)  Dispense: 90 capsule; Refill: 3    8. Cigarette nicotine dependence without complication    Con't patches  Add wellbutrin  F/u if unable to quit    - buPROPion (WELLBUTRIN SR) 150 MG extended release tablet;  Take 1 tablet by mouth daily for 3 days, THEN 1 tablet 2 times daily. Do this for 3 months. .  Dispense: 177 tablet; Refill: 0    9. Screening for colon cancer    - Cologuard (Fecal DNA Colorectal Cancer Screening); Future      DISPOSITION    Return in about 6 months (around 9/2/2023) for Follow-up Diabetes, follow-up on chronic medical conditions, sooner as needed. Reynaldo released without restrictions. Future Appointments   Date Time Provider Farhana Taylori   3/31/2023  8:30 AM Josee Watson MD [address] [place] [address]   9/11/2023  7:40 AM Johnna Mejias DO [facility]     PATIENT COUNSELING    Reynaldo received counseling on the following healthy behaviors: nutrition, exercise, medication adherence and tobacco cessation    Barriers to learning and self management: none    Discussed use, benefit, and side effects of prescribed medications. Barriers to medication compliance addressed. All patient questions answered. Pt voiced understanding.        Electronically signed by Johnna Mejias DO on 3/2/2023 at 5:23 PM

## 2023-03-02 ENCOUNTER — OFFICE VISIT (OUTPATIENT)
Dept: FAMILY MEDICINE CLINIC | Age: 48
End: 2023-03-02
Payer: COMMERCIAL

## 2023-03-02 VITALS
SYSTOLIC BLOOD PRESSURE: 130 MMHG | HEIGHT: 73 IN | HEART RATE: 94 BPM | BODY MASS INDEX: 38.73 KG/M2 | TEMPERATURE: 97.8 F | OXYGEN SATURATION: 98 % | DIASTOLIC BLOOD PRESSURE: 80 MMHG | RESPIRATION RATE: 16 BRPM | WEIGHT: 292.2 LBS

## 2023-03-02 DIAGNOSIS — G47.33 OSA (OBSTRUCTIVE SLEEP APNEA): ICD-10-CM

## 2023-03-02 DIAGNOSIS — E11.9 TYPE 2 DIABETES MELLITUS WITHOUT COMPLICATION, WITHOUT LONG-TERM CURRENT USE OF INSULIN (HCC): Primary | ICD-10-CM

## 2023-03-02 DIAGNOSIS — F17.210 CIGARETTE NICOTINE DEPENDENCE WITHOUT COMPLICATION: ICD-10-CM

## 2023-03-02 DIAGNOSIS — Z12.11 SCREENING FOR COLON CANCER: ICD-10-CM

## 2023-03-02 DIAGNOSIS — I10 HYPERTENSION, ESSENTIAL: ICD-10-CM

## 2023-03-02 DIAGNOSIS — M19.072 PRIMARY OSTEOARTHRITIS OF LEFT FOOT: ICD-10-CM

## 2023-03-02 DIAGNOSIS — E78.5 DYSLIPIDEMIA: ICD-10-CM

## 2023-03-02 DIAGNOSIS — E66.9 OBESITY (BMI 30-39.9): ICD-10-CM

## 2023-03-02 DIAGNOSIS — K21.9 GASTROESOPHAGEAL REFLUX DISEASE, UNSPECIFIED WHETHER ESOPHAGITIS PRESENT: ICD-10-CM

## 2023-03-02 PROCEDURE — 3075F SYST BP GE 130 - 139MM HG: CPT | Performed by: FAMILY MEDICINE

## 2023-03-02 PROCEDURE — 3079F DIAST BP 80-89 MM HG: CPT | Performed by: FAMILY MEDICINE

## 2023-03-02 PROCEDURE — 99214 OFFICE O/P EST MOD 30 MIN: CPT | Performed by: FAMILY MEDICINE

## 2023-03-02 PROCEDURE — G8417 CALC BMI ABV UP PARAM F/U: HCPCS | Performed by: FAMILY MEDICINE

## 2023-03-02 PROCEDURE — G8427 DOCREV CUR MEDS BY ELIG CLIN: HCPCS | Performed by: FAMILY MEDICINE

## 2023-03-02 PROCEDURE — G8484 FLU IMMUNIZE NO ADMIN: HCPCS | Performed by: FAMILY MEDICINE

## 2023-03-02 PROCEDURE — 4004F PT TOBACCO SCREEN RCVD TLK: CPT | Performed by: FAMILY MEDICINE

## 2023-03-02 PROCEDURE — 2022F DILAT RTA XM EVC RTNOPTHY: CPT | Performed by: FAMILY MEDICINE

## 2023-03-02 PROCEDURE — 3044F HG A1C LEVEL LT 7.0%: CPT | Performed by: FAMILY MEDICINE

## 2023-03-02 RX ORDER — BUPROPION HYDROCHLORIDE 150 MG/1
TABLET, EXTENDED RELEASE ORAL
Qty: 177 TABLET | Refills: 0 | Status: SHIPPED | OUTPATIENT
Start: 2023-03-02 | End: 2023-05-31

## 2023-03-02 RX ORDER — OMEPRAZOLE 20 MG/1
20 CAPSULE, DELAYED RELEASE ORAL
Qty: 90 CAPSULE | Refills: 3 | Status: SHIPPED | OUTPATIENT
Start: 2023-03-02

## 2023-03-02 SDOH — ECONOMIC STABILITY: FOOD INSECURITY: WITHIN THE PAST 12 MONTHS, THE FOOD YOU BOUGHT JUST DIDN'T LAST AND YOU DIDN'T HAVE MONEY TO GET MORE.: NEVER TRUE

## 2023-03-02 SDOH — ECONOMIC STABILITY: FOOD INSECURITY: WITHIN THE PAST 12 MONTHS, YOU WORRIED THAT YOUR FOOD WOULD RUN OUT BEFORE YOU GOT MONEY TO BUY MORE.: NEVER TRUE

## 2023-03-02 SDOH — ECONOMIC STABILITY: INCOME INSECURITY: HOW HARD IS IT FOR YOU TO PAY FOR THE VERY BASICS LIKE FOOD, HOUSING, MEDICAL CARE, AND HEATING?: NOT HARD AT ALL

## 2023-03-02 ASSESSMENT — PATIENT HEALTH QUESTIONNAIRE - PHQ9
SUM OF ALL RESPONSES TO PHQ9 QUESTIONS 1 & 2: 0
SUM OF ALL RESPONSES TO PHQ QUESTIONS 1-9: 0
SUM OF ALL RESPONSES TO PHQ QUESTIONS 1-9: 0
1. LITTLE INTEREST OR PLEASURE IN DOING THINGS: 0
SUM OF ALL RESPONSES TO PHQ QUESTIONS 1-9: 0
SUM OF ALL RESPONSES TO PHQ QUESTIONS 1-9: 0
2. FEELING DOWN, DEPRESSED OR HOPELESS: 0

## 2023-03-15 DIAGNOSIS — I10 HYPERTENSION, ESSENTIAL: ICD-10-CM

## 2023-03-15 RX ORDER — LISINOPRIL 40 MG/1
40 TABLET ORAL DAILY
Qty: 90 TABLET | Refills: 3 | Status: SHIPPED | OUTPATIENT
Start: 2023-03-15

## 2023-03-15 NOTE — TELEPHONE ENCOUNTER
Recent Visits  Date Type Provider Dept   03/02/23 Office Visit Tucker Moncada DO [facility]   08/26/22 Office Visit Tucker Moncada DO [facility]   Showing recent visits within past 540 days with a meds authorizing provider and meeting all other requirements  Future Appointments  No visits were found meeting these conditions.   Showing future appointments within next 150 days with a meds authorizing provider and meeting all other requirements     Future Appointments   Date Time Provider Farhana El   3/31/2023  8:30 AM Yonny Andrade MD [address] [place] [address]   9/11/2023  7:40 AM Tucker Moncada DO [facility]

## 2023-03-25 DIAGNOSIS — M79.672 LEFT FOOT PAIN: ICD-10-CM

## 2023-03-26 DIAGNOSIS — I10 HYPERTENSION, ESSENTIAL: ICD-10-CM

## 2023-03-27 RX ORDER — LISINOPRIL 20 MG/1
TABLET ORAL
Qty: 90 TABLET | Refills: 3 | OUTPATIENT
Start: 2023-03-27

## 2023-03-27 RX ORDER — MELOXICAM 15 MG/1
TABLET ORAL
Qty: 90 TABLET | Refills: 0 | Status: SHIPPED | OUTPATIENT
Start: 2023-03-27

## 2023-03-31 ENCOUNTER — OFFICE VISIT (OUTPATIENT)
Dept: PULMONOLOGY | Age: 48
End: 2023-03-31
Payer: MEDICAID

## 2023-03-31 VITALS
HEART RATE: 84 BPM | WEIGHT: 293 LBS | DIASTOLIC BLOOD PRESSURE: 80 MMHG | BODY MASS INDEX: 38.83 KG/M2 | OXYGEN SATURATION: 97 % | SYSTOLIC BLOOD PRESSURE: 128 MMHG | HEIGHT: 73 IN | TEMPERATURE: 98.2 F

## 2023-03-31 DIAGNOSIS — G47.33 OSA ON CPAP: ICD-10-CM

## 2023-03-31 DIAGNOSIS — Z99.89 OSA ON CPAP: ICD-10-CM

## 2023-03-31 DIAGNOSIS — J30.9 ALLERGIC RHINITIS, UNSPECIFIED SEASONALITY, UNSPECIFIED TRIGGER: Primary | ICD-10-CM

## 2023-03-31 DIAGNOSIS — I10 HYPERTENSION, ESSENTIAL: ICD-10-CM

## 2023-03-31 PROCEDURE — 99204 OFFICE O/P NEW MOD 45 MIN: CPT | Performed by: INTERNAL MEDICINE

## 2023-03-31 PROCEDURE — 3074F SYST BP LT 130 MM HG: CPT | Performed by: INTERNAL MEDICINE

## 2023-03-31 PROCEDURE — 3079F DIAST BP 80-89 MM HG: CPT | Performed by: INTERNAL MEDICINE

## 2023-03-31 RX ORDER — FLUTICASONE PROPIONATE 50 MCG
2 SPRAY, SUSPENSION (ML) NASAL DAILY
Qty: 16 G | Refills: 11 | Status: SHIPPED | OUTPATIENT
Start: 2023-03-31

## 2023-03-31 NOTE — PATIENT INSTRUCTIONS
Recommendations/Plan:  - Start patient on Flonase 50mcg/INH 2sprays each nostril daily. He  was informed about adverse effects of Flonase. He was demonstrated in my office how to use Flonase. He verbalizes understanding.  -We will schedule patient for a portable/home sleep study to check the current status of sleep apnea. Patient did not trust the in lab sleep study performed at [facility] on 30 November 2021. He told me that his wires come off from his head during the sleep study. He also did not sleep well in the sleep lab on the day of test.  However, his sleep efficiency was mentioned a 75.3% on a sleep study.  -He was advised to continue to use his current CPAP machine until he comes for follow-up in a month with above portable/home sleep study to go over the results along with recent download from his CPAP device. He follows with the [address].  -He was advised to loose weight by controlling diet and doing exercise.  -He advised to keep good compliance with current recommended pressure to get optimal results and clinical improvement.  -Follow with my clinic in 1month for clinical reevaluation with review of download and to go over the above Home/portable sleep study to check the current status of sleep apnea  -He was advised to call [facility] regarding supplies if needed. -He was advised to practice good sleep hygiene techniques. He was provided with a hand out.  -He was advised call my office for earlier appointment if needed for worsening of sleep symptoms.  -Alexandrea Alvarado educated about my impression and plan. Patient verbalizes understanding.

## 2023-03-31 NOTE — PROGRESS NOTES
Chief Complaint: New sleep consult, referred for PATRIC by Dr. Dheeraj Montana. Prior sleep studies done at [facility] in 2021. Not currently on pap therapy.      Mallampati airway Class: 4  Neck Circumference: 20 Inches    [place] sleepiness score 3/31/23: 2
breath sounds normal. No stridor. No respiratory distress. No wheezes. No rales. Patient exhibits no tenderness. Abdominal: Soft. Patient exhibits no distension. No tenderness. Musculoskeletal: Normal range of motion. Extremities: Patient exhibits no edema and no tenderness. Lymphadenopathy:  No cervical adenopathy. Neurological: Patient is alert and oriented to person, place, and time. Skin: Skin is warm and dry. Patient is not diaphoretic. Psychiatric: Patient  has a normal mood and affect. Patient behavior is normal.     Diagnostic Data:      Sleep test: Performed on 30 November 2021                            CPAP test:  None available in epic    Assessment:  -Severe obstructive sleep apnea diagnosed by a baseline sleep study performed on 30 November 2021 at [facility] sleep lab. He was subsequently issued a CPAP device with unknown pressure settings. Patient used his CPAP device for only 2 months from July 2022 (his CPAP machine was set up in July 2022) quit using his CPAP machine due to difficulty in tolerating the CPAP pressure settings and mask. He is currently not on any treatment for his severe obstructive sleep apnea for the last 7 months. Patient is not confident about his sleep testing done at [facility].  He want to go for repeat sleep study preferably with a home testing to confirm the status of his sleep apnea. -Inadequate sleep hygiene.  -Allergic rhinitis not under good control  -Essential hypertension on treatment with medications under control. -GERD on treatment with PPI. -Chronic history of tobacco smoking for 20 years with 1 pack of cigarettes per day. He is currently in the process of quitting smoking. He is still smoking less than 1 pack of cigarettes per day. He is on Wellbutrin SR for smoking cessation along with nicotine patches. Recommendations/Plan:  - Start patient on Flonase 50mcg/INH 2sprays each nostril daily.  He  was informed

## 2023-04-05 ENCOUNTER — TELEPHONE (OUTPATIENT)
Dept: FAMILY MEDICINE CLINIC | Age: 48
End: 2023-04-05

## 2023-04-05 NOTE — TELEPHONE ENCOUNTER
----- Message from Venus Apgar, DO sent at 2023 10:02 AM EDT -----  Please let pt know that Cologuard is negative. Repeat 3 years per current guidelines. Let me know if questions, thanks!

## 2023-04-24 ENCOUNTER — TELEPHONE (OUTPATIENT)
Dept: SLEEP CENTER | Age: 48
End: 2023-04-24

## 2023-04-24 NOTE — TELEPHONE ENCOUNTER
I rescheduled patient to 5/26 to follow up. Patient is aware and patient stated that Wilian Ruiz  took his sleep machine last Monday due to non compliance which he stated to them that he has a sleep study coming up but they still took his machine so he isn't wearing one now.  MICHELLE

## 2023-04-24 NOTE — TELEPHONE ENCOUNTER
I spoke with Simon Valle this morning and explained to him that his Insurance does not cover a home sleep study. They only cover an in-lab sleep study. He is scheduled for a PSG on 5/16/23. He is scheduled for a F/U with Dr. Williams Amezcua on 5/12/23 that will need to be rescheduled. He was originally scheduled for an HST. Thanks.  Myrtle Dandy.

## 2023-06-05 DIAGNOSIS — M79.672 LEFT FOOT PAIN: ICD-10-CM

## 2023-06-06 RX ORDER — MELOXICAM 15 MG/1
TABLET ORAL
Qty: 90 TABLET | Refills: 0 | Status: SHIPPED | OUTPATIENT
Start: 2023-06-06

## 2023-06-06 NOTE — TELEPHONE ENCOUNTER
Last visit- 3/2/2023  Next visit- 9/11/2023    Requested Prescriptions     Pending Prescriptions Disp Refills    meloxicam (MOBIC) 15 MG tablet 90 tablet 0     Sig: TAKE 1 TABLET BY MOUTH EVERY DAY AS NEEDED FOR PAIN

## 2023-07-17 DIAGNOSIS — M79.672 LEFT FOOT PAIN: ICD-10-CM

## 2023-07-18 RX ORDER — MELOXICAM 15 MG/1
TABLET ORAL
Qty: 90 TABLET | Refills: 0 | Status: SHIPPED | OUTPATIENT
Start: 2023-07-18

## 2023-07-18 NOTE — TELEPHONE ENCOUNTER
Recent Visits  Date Type Provider Dept   03/02/23 Office Visit Genevieve Santiago DO [facility]   08/26/22 Office Visit Genevieve Santiago DO [facility]   Showing recent visits within past 540 days with a meds authorizing provider and meeting all other requirements  Future Appointments  Date Type Provider Dept   09/11/23 Appointment Genevieve Santiago DO [facility]   Showing future appointments within next 150 days with a meds authorizing provider and meeting all other requirements

## 2023-07-25 ENCOUNTER — HOSPITAL ENCOUNTER (OUTPATIENT)
Dept: SLEEP CENTER | Age: 48
Discharge: HOME | End: 2023-07-27
Attending: INTERNAL MEDICINE
Payer: MEDICAID

## 2023-07-25 ENCOUNTER — TELEPHONE (OUTPATIENT)
Dept: SLEEP CENTER | Age: 48
End: 2023-07-25

## 2023-07-25 DIAGNOSIS — G47.33 OSA ON CPAP: ICD-10-CM

## 2023-07-25 DIAGNOSIS — I10 HYPERTENSION, ESSENTIAL: ICD-10-CM

## 2023-07-25 DIAGNOSIS — J30.9 ALLERGIC RHINITIS, UNSPECIFIED SEASONALITY, UNSPECIFIED TRIGGER: ICD-10-CM

## 2023-07-25 DIAGNOSIS — G47.30 SLEEP APNEA, UNSPECIFIED TYPE: ICD-10-CM

## 2023-07-25 DIAGNOSIS — Z99.89 OSA ON CPAP: ICD-10-CM

## 2023-07-25 PROCEDURE — 95810 POLYSOM 6/> YRS 4/> PARAM: CPT

## 2023-07-25 NOTE — TELEPHONE ENCOUNTER
Please schedule a f/u appt for Reynaldo. His PSG is tonight, 07/25/23.         Thank you,  Meaghan Manzano

## 2023-07-26 LAB — STATUS: NORMAL

## 2023-07-27 NOTE — PROGRESS NOTES
[place] [address]                               SLEEP STUDY REPORT    PATIENT NAME: Mady Heart                     :        1975  MED REC NO:   [id number]                           ROOM:  ACCOUNT NO:   [de-identified]                           ADMIT DATE: 2023  PROVIDER:     Erasto Lara. MD Willian    DATE OF STUDY:  2023    REFERRING PROVIDER:  Judah Smith DO    The patient's height is 73 inches, weight is 203 pounds with a BMI of  38.7. HISTORY:  The patient is a 44-year-old gentleman who was initially  evaluated by me on 2023. The patient was diagnosed with severe  obstructive sleep apnea by a baseline sleep study performed in  at  [facility].  The patient used to be on treatment with CPAP  device with unknown CPAP pressure settings. The patient did not trust  the in-lab sleep study performed at [facility] on  2021. The patient told me that his wife's came off during the  study. The patient currently is on treatment with CPAP device. The  patient is scheduled for in-lab sleep study to check the current status  of sleep apnea. The patient had associated comorbidities including  allergic rhinitis, essential hypertension and GERD. METHODS:  The patient underwent digital polysomnography in compliance  with the standards and specifications from the AASM Manual including the  simultaneous recording of 3 EEG channels (F4M1, C4M1, and O2M1 with back  up electrodes F3M2, C3M2, and O1M2), 2 EOG channels (E1M2, and E2M1,),  EMG (chin, left & right leg), EKG, Nonin pulse oximetry with  less than  2 second averaging time, body position, airflow recorded by oralnasal  thermal sensor and nasal air pressure transducer, plus respiratory  effort recorded by calibrated respiratory inductance plethysmography  (RIP), flow volume loop, sound and video.   Sleep staging

## 2023-08-09 ENCOUNTER — OFFICE VISIT (OUTPATIENT)
Dept: PULMONOLOGY | Age: 48
End: 2023-08-09
Payer: COMMERCIAL

## 2023-08-09 VITALS
BODY MASS INDEX: 38.8 KG/M2 | OXYGEN SATURATION: 97 % | SYSTOLIC BLOOD PRESSURE: 152 MMHG | HEIGHT: 73 IN | WEIGHT: 292.8 LBS | DIASTOLIC BLOOD PRESSURE: 100 MMHG | HEART RATE: 76 BPM | TEMPERATURE: 98.2 F

## 2023-08-09 DIAGNOSIS — G47.33 OSA ON CPAP: Primary | ICD-10-CM

## 2023-08-09 DIAGNOSIS — E66.9 OBESITY (BMI 30-39.9): Chronic | ICD-10-CM

## 2023-08-09 DIAGNOSIS — Z99.89 OSA ON CPAP: Primary | ICD-10-CM

## 2023-08-09 DIAGNOSIS — K21.9 GASTROESOPHAGEAL REFLUX DISEASE, UNSPECIFIED WHETHER ESOPHAGITIS PRESENT: Chronic | ICD-10-CM

## 2023-08-09 PROCEDURE — 4004F PT TOBACCO SCREEN RCVD TLK: CPT | Performed by: NURSE PRACTITIONER

## 2023-08-09 PROCEDURE — 3077F SYST BP >= 140 MM HG: CPT | Performed by: NURSE PRACTITIONER

## 2023-08-09 PROCEDURE — 3080F DIAST BP >= 90 MM HG: CPT | Performed by: NURSE PRACTITIONER

## 2023-08-09 PROCEDURE — 99214 OFFICE O/P EST MOD 30 MIN: CPT | Performed by: NURSE PRACTITIONER

## 2023-08-09 PROCEDURE — G8427 DOCREV CUR MEDS BY ELIG CLIN: HCPCS | Performed by: NURSE PRACTITIONER

## 2023-08-09 PROCEDURE — G8417 CALC BMI ABV UP PARAM F/U: HCPCS | Performed by: NURSE PRACTITIONER

## 2023-08-09 RX ORDER — ZOLPIDEM TARTRATE 10 MG/1
10 TABLET ORAL ONCE
Qty: 1 TABLET | Refills: 0 | Status: SHIPPED | OUTPATIENT
Start: 2023-08-09 | End: 2023-08-09

## 2023-08-09 ASSESSMENT — ENCOUNTER SYMPTOMS
EYES NEGATIVE: 1
WHEEZING: 0
VOMITING: 0
GASTROINTESTINAL NEGATIVE: 1
RESPIRATORY NEGATIVE: 1
ALLERGIC/IMMUNOLOGIC NEGATIVE: 1
DIARRHEA: 0
NAUSEA: 0
STRIDOR: 0
CHEST TIGHTNESS: 0

## 2023-08-09 NOTE — PROGRESS NOTES
[facility]    Evelio Dumas, 57 y.o.  [id number]      Patient of [facility] [place]  Nurses Notes   PSG f/u  Study Results  Initial Study Date - 7/25/23  AHI -  75.2    TotalEvents - 407  (Apneas  72  Hypopneas 335  Central  1)  LM w/Arousals - 0  Sleep Efficiency - 86.6 % (Total Sleep Time - 324.8 min)  Time with Sats below 88% - 44.4 min  Interval History       Reynaldo Estevez is a 57 y.o. old male who comes in to review the results of his recent sleep study, to answer questions and to explore options for treatment. he continues to have symptoms of snoring, periods of not breathing, feels sleepy during the day  BMI 38  No sleep medication use at  night   Previous sleep study done in 2021- currently on no PAP therapy     Weight stable / unchanged    DATE OF STUDY:  07/25/2023   SLEEP STUDY REPORT     HISTORY:  The patient is a 53-year-old gentleman who was initially  evaluated by me on 03/31/2023. The patient was diagnosed with severe  obstructive sleep apnea by a baseline sleep study performed in 2021 at  [facility].  The patient used to be on treatment with CPAP  device with unknown CPAP pressure settings. The patient did not trust  the in-lab sleep study performed at [facility] on  11/30/2021. The patient told me that his wife's came off during the  study. The patient currently is on treatment with CPAP device. The  patient is scheduled for in-lab sleep study to check the current status  of sleep apnea. The patient had associated comorbidities including  allergic rhinitis, essential hypertension and GERD.      METHODS:  The patient underwent digital polysomnography in compliance  with the standards and specifications from the AASM Manual including the  simultaneous recording of 3 EEG channels (F4M1, C4M1, and O2M1 with back  up electrodes F3M2, C3M2, and O1M2), 2 EOG channels (E1M2, and E2M1,),  EMG (chin, left & right leg), EKG, Nonin pulse
Nostril route daily 16 g 11    lisinopril (PRINIVIL;ZESTRIL) 40 MG tablet Take 1 tablet by mouth daily 90 tablet 3    omeprazole (PRILOSEC) 20 MG delayed release capsule Take 1 capsule by mouth every morning (before breakfast) 90 capsule 3    atorvastatin (LIPITOR) 20 MG tablet TAKE 1 TABLET BY MOUTH EVERY DAY 90 tablet 3    amLODIPine (NORVASC) 10 MG tablet TAKE 1 TABLET BY MOUTH EVERY DAY 90 tablet 4     No current facility-administered medications for this visit. Family History   Problem Relation Age of Onset    High Blood Pressure Mother     High Blood Pressure Father     Diabetes Father     Colon Cancer Maternal Grandfather         ?76s when diagnose    Prostate Cancer Neg Hx           There were no vitals taken for this visit. BMI:  There is no height or weight on file to calculate BMI. Physical Exam :  Constitutional: Patient appears moderately built and moderately nourished. No distress. Patient is oriented to person, place, and time. HENT:   Head: Normocephalic and atraumatic. Right Ear: External ear normal.   Left Ear: External ear normal.   Mouth/Throat: Oropharynx is clear and moist.   Eyes: Conjunctivae are normal. Pupils are equal and reactive to light. No scleral icterus. Neck: Neck supple. No JVD present. Cardiovascular: Normal rate, regular rhythm, normal heart sounds. No murmur heard. Pulmonary/Chest: Effort normal and breath sounds normal. No stridor. No respiratory distress. No wheezes. No rales. Abdominal: Soft. Patient exhibits no distension. No tenderness. Musculoskeletal: Normal range of motion. Extremities: Patient exhibits no erythema or no edema. Lymphadenopathy:  No cervical adenopathy. Neurological: Patient is alert and oriented to person, place, and time. Skin: Skin is warm and dry. Patient is not diaphoretic. Psychiatric: Patient  has a normal mood and affect.     Diagnostic Data:    Sleep study done on :            SLEEP STUDY REPORT     PATIENT

## 2023-08-21 ENCOUNTER — TELEPHONE (OUTPATIENT)
Dept: FAMILY MEDICINE CLINIC | Age: 48
End: 2023-08-21

## 2023-08-21 RX ORDER — MELOXICAM 15 MG/1
TABLET ORAL
Qty: 90 TABLET | Refills: 0 | Status: CANCELLED | OUTPATIENT
Start: 2023-08-21

## 2023-08-22 NOTE — TELEPHONE ENCOUNTER
Pt will check with his insurance to see if he can go with mail order for 90 day supply    He will let us know

## 2023-08-22 NOTE — TELEPHONE ENCOUNTER
Recent Visits  Date Type Provider Dept   03/02/23 Office Visit Judah Smith, DO [facility]   08/26/22 Office Visit Judah Smith, DO [facility]   Showing recent visits within past 540 days with a meds authorizing provider and meeting all other requirements  Future Appointments  Date Type Provider Dept   09/11/23 Appointment Judah Smith DO [facility]   Showing future appointments within next 150 days with a meds authorizing provider and meeting all other requirements

## 2023-08-22 NOTE — TELEPHONE ENCOUNTER
How often is he taking the mobic? I sent in 90 tabs in July, which should have lasted 3 months  Pt sent message today asking for more, as he already has run out  Can you f/u with pt and pharmacy and see what the issue is? Let me know, thanks!

## 2023-08-22 NOTE — TELEPHONE ENCOUNTER
Pt is only taking 1 tab daily-he states he only is given 30 tabs at a time    [facility], pt has 2 refills left and she will get one ready for him    Pt informed and verbalized understanding.

## 2023-08-27 PROBLEM — G47.33 OSA (OBSTRUCTIVE SLEEP APNEA): Status: ACTIVE | Noted: 2023-08-27

## 2023-08-27 PROBLEM — J30.9 ALLERGIC RHINITIS: Status: ACTIVE | Noted: 2023-08-27

## 2023-09-10 NOTE — PROGRESS NOTES
Chief Complaint   Patient presents with    Follow-up     DM2 and chronic issues noted below     History obtained from the patient. SUBJECTIVE:  Bairon Cheng is a 57 y.o. male that presents today for     -DM2: Diet controlled  Dx about 6 months ago. Well controlled  Diet not the best  Not as active as should be    Lab Results   Component Value Date/Time    LABA1C 6.7 09/11/2023 06:42 AM    LABA1C 6.6 03/01/2023 07:11 AM    LABA1C 6.5 08/23/2022 07:30 AM    LABA1C 6.3 04/28/2021 07:40 AM       -HTN:    HPI:    Taking meds as prescribed ?: yes  Tolerating well ?: yes  Side Effects ?: no  BP at home ?: <140/90  Working on TLCS ?: yes  Chest Pain/SOB/Palpitations? no      -HLD:    HPI:    Taking meds as prescribed ?: yes  Tolerating well ?: yes  Side Effects ?: no  Muscle Pain?: no  Working on TLCS ?: yes      -PATRIC LAST VISIT:  Had sleep study at [facility] in [address]  Told has PATRIC  Sent by [facility] by [employer]  Denies fatigue, wife states does not snore or stop breathing  Wants 2nd opinion    UPDATE TODAY:   Saw sleep clinic  Setup for sleep study.        -OA L foot:  Chronic issue  On mobic  Works pretty well overall       -GERD:    HPI:    Taking meds as prescribed ?: yes  Tolerating well ?: yes  Side Effects ?: no  Dysphagia ?: no  Odynophagia ?: no  Melena ?: yes  Working on TLCS ?: no        -Smoking:  Tried wellbutrin last visit  Didn't like twice daily dose, didn't feel well  So stopped  Still smoking  Asked if ok to take once daily      Age/Gender Health Maintenance    Lipid -   Lab Results   Component Value Date    CHOL 135 03/01/2023    CHOL 142 08/23/2022    CHOL 194 04/15/2022     Lab Results   Component Value Date    TRIG 81 03/01/2023    TRIG 75 08/23/2022    TRIG 77 04/15/2022     Lab Results   Component Value Date    HDL 43 03/01/2023    HDL 39 08/23/2022    HDL 44 04/15/2022     Lab Results   Component Value Date    LDLCALC 76 03/01/2023    LDLCALC 88 08/23/2022    LDLCALC 135 (H) 04/15/2022

## 2023-09-11 ENCOUNTER — OFFICE VISIT (OUTPATIENT)
Dept: FAMILY MEDICINE CLINIC | Age: 48
End: 2023-09-11
Payer: COMMERCIAL

## 2023-09-11 VITALS
SYSTOLIC BLOOD PRESSURE: 130 MMHG | TEMPERATURE: 97.2 F | DIASTOLIC BLOOD PRESSURE: 72 MMHG | RESPIRATION RATE: 16 BRPM | HEIGHT: 73 IN | WEIGHT: 294.4 LBS | HEART RATE: 68 BPM | OXYGEN SATURATION: 98 % | BODY MASS INDEX: 39.02 KG/M2

## 2023-09-11 DIAGNOSIS — E78.5 DYSLIPIDEMIA: ICD-10-CM

## 2023-09-11 DIAGNOSIS — F17.210 CIGARETTE NICOTINE DEPENDENCE WITHOUT COMPLICATION: ICD-10-CM

## 2023-09-11 DIAGNOSIS — K21.9 GASTROESOPHAGEAL REFLUX DISEASE, UNSPECIFIED WHETHER ESOPHAGITIS PRESENT: ICD-10-CM

## 2023-09-11 DIAGNOSIS — G47.33 OSA (OBSTRUCTIVE SLEEP APNEA): ICD-10-CM

## 2023-09-11 DIAGNOSIS — E66.9 OBESITY (BMI 30-39.9): ICD-10-CM

## 2023-09-11 DIAGNOSIS — E11.9 TYPE 2 DIABETES MELLITUS WITHOUT COMPLICATION, WITHOUT LONG-TERM CURRENT USE OF INSULIN (HCC): Primary | ICD-10-CM

## 2023-09-11 DIAGNOSIS — I10 HYPERTENSION, ESSENTIAL: ICD-10-CM

## 2023-09-11 DIAGNOSIS — M19.072 PRIMARY OSTEOARTHRITIS OF LEFT FOOT: ICD-10-CM

## 2023-09-11 LAB — HBA1C MFR BLD: 6.7 % (ref 4.3–5.7)

## 2023-09-11 PROCEDURE — 99214 OFFICE O/P EST MOD 30 MIN: CPT | Performed by: FAMILY MEDICINE

## 2023-09-11 PROCEDURE — 3075F SYST BP GE 130 - 139MM HG: CPT | Performed by: FAMILY MEDICINE

## 2023-09-11 PROCEDURE — G8427 DOCREV CUR MEDS BY ELIG CLIN: HCPCS | Performed by: FAMILY MEDICINE

## 2023-09-11 PROCEDURE — G8417 CALC BMI ABV UP PARAM F/U: HCPCS | Performed by: FAMILY MEDICINE

## 2023-09-11 PROCEDURE — 2022F DILAT RTA XM EVC RTNOPTHY: CPT | Performed by: FAMILY MEDICINE

## 2023-09-11 PROCEDURE — 3078F DIAST BP <80 MM HG: CPT | Performed by: FAMILY MEDICINE

## 2023-09-11 PROCEDURE — 3044F HG A1C LEVEL LT 7.0%: CPT | Performed by: FAMILY MEDICINE

## 2023-09-11 PROCEDURE — 4004F PT TOBACCO SCREEN RCVD TLK: CPT | Performed by: FAMILY MEDICINE

## 2023-09-28 DIAGNOSIS — M79.672 LEFT FOOT PAIN: ICD-10-CM

## 2023-09-29 RX ORDER — MELOXICAM 15 MG/1
TABLET ORAL
Qty: 90 TABLET | Refills: 3 | Status: SHIPPED | OUTPATIENT
Start: 2023-09-29

## 2023-09-29 NOTE — TELEPHONE ENCOUNTER
Recent Visits  Date Type Provider Dept   09/11/23 Office Visit Cameron Ashton, DO [facility]   03/02/23 Office Visit Cameron Ashton, DO [facility]   08/26/22 Office Visit Cameron Ashton, DO [facility]   Showing recent visits within past 540 days with a meds authorizing provider and meeting all other requirements  Future Appointments  No visits were found meeting these conditions.   Showing future appointments within next 150 days with a meds authorizing provider and meeting all other requirements

## 2023-10-03 DIAGNOSIS — E78.5 DYSLIPIDEMIA: ICD-10-CM

## 2023-10-04 RX ORDER — ATORVASTATIN CALCIUM 20 MG/1
TABLET, FILM COATED ORAL
Qty: 90 TABLET | Refills: 3 | Status: SHIPPED | OUTPATIENT
Start: 2023-10-04

## 2023-10-04 NOTE — TELEPHONE ENCOUNTER
Recent Visits  Date Type Provider Dept   09/11/23 Office Visit Earma Asters, DO [facility]   03/02/23 Office Visit Earma Asters, DO [facility]   08/26/22 Office Visit Earma Asters, DO [facility]   Showing recent visits within past 540 days with a meds authorizing provider and meeting all other requirements  Future Appointments  No visits were found meeting these conditions.   Showing future appointments within next 150 days with a meds authorizing provider and meeting all other requirements

## 2023-10-11 ENCOUNTER — TELEPHONE (OUTPATIENT)
Dept: SLEEP CENTER | Age: 48
End: 2023-10-11

## 2023-10-13 DIAGNOSIS — I10 HYPERTENSION, ESSENTIAL: ICD-10-CM

## 2023-10-13 RX ORDER — AMLODIPINE BESYLATE 10 MG/1
TABLET ORAL
Qty: 90 TABLET | Refills: 3 | Status: SHIPPED | OUTPATIENT
Start: 2023-10-13

## 2023-11-09 DIAGNOSIS — K21.9 GASTROESOPHAGEAL REFLUX DISEASE, UNSPECIFIED WHETHER ESOPHAGITIS PRESENT: ICD-10-CM

## 2023-11-09 DIAGNOSIS — M79.672 LEFT FOOT PAIN: ICD-10-CM

## 2023-11-10 RX ORDER — MELOXICAM 15 MG/1
TABLET ORAL
Qty: 90 TABLET | Refills: 3 | Status: SHIPPED | OUTPATIENT
Start: 2023-11-10

## 2023-11-10 RX ORDER — OMEPRAZOLE 20 MG/1
20 CAPSULE, DELAYED RELEASE ORAL
Qty: 90 CAPSULE | Refills: 3 | Status: SHIPPED | OUTPATIENT
Start: 2023-11-10

## 2023-11-10 NOTE — TELEPHONE ENCOUNTER
Recent Visits  Date Type Provider Dept   09/11/23 Office Visit Nicolle Barber DO [facility]   03/02/23 Office Visit Nicolle Barber DO [facility]   08/26/22 Office Visit Nicolle Barber DO [facility]   Showing recent visits within past 540 days with a meds authorizing provider and meeting all other requirements  Future Appointments  Date Type Provider Dept   03/20/24 Appointment Nicolle Barber DO [facility]   Showing future appointments within next 150 days with a meds authorizing provider and meeting all other requirements

## 2024-02-08 ENCOUNTER — TELEPHONE (OUTPATIENT)
Dept: FAMILY MEDICINE CLINIC | Age: 49
End: 2024-02-08

## 2024-02-08 DIAGNOSIS — E11.9 TYPE 2 DIABETES MELLITUS WITHOUT COMPLICATION, WITHOUT LONG-TERM CURRENT USE OF INSULIN (HCC): Primary | ICD-10-CM

## 2024-02-08 NOTE — TELEPHONE ENCOUNTER
Pt due for fasting labs prior to next apt on 3/20/2024. Please call to have pt complete this. Thanks!    ASSESSMENT & PLAN   Diagnosis Orders   1. Type 2 diabetes mellitus without complication, without long-term current use of insulin (HCC)  CBC with Auto Differential    Comprehensive Metabolic Panel    Lipid Panel    TSH with Reflex    Microalbumin / Creatinine Urine Ratio        Future Appointments   Date Time Provider Department Center   3/20/2024  7:40 AM Wei Granda, DO [facility] [place]

## 2024-02-20 DIAGNOSIS — M79.672 LEFT FOOT PAIN: ICD-10-CM

## 2024-02-20 RX ORDER — MELOXICAM 15 MG/1
TABLET ORAL
Qty: 90 TABLET | Refills: 3 | Status: CANCELLED | OUTPATIENT
Start: 2024-02-20

## 2024-02-20 NOTE — TELEPHONE ENCOUNTER
Per pt: \"Patient comment: I am about to go to school in [place] for 4 months how can I get enough meds for the whole time I am gone. \"    Please clarify what exactly he needs and let me know, thanks!

## 2024-02-20 NOTE — TELEPHONE ENCOUNTER
Recent Visits  Date Type Provider Dept   09/11/23 Office Visit Wei Granda, DO [facility]   03/02/23 Office Visit Wei Granda, DO [facility]   Showing recent visits within past 540 days with a meds authorizing provider and meeting all other requirements  Future Appointments  Date Type Provider Dept   03/20/24 Appointment Wei Granda, DO [facility]   Showing future appointments within next 150 days with a meds authorizing provider and meeting all other requirements

## 2024-02-21 NOTE — TELEPHONE ENCOUNTER
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  Pt is asking what would be the best way to get refills for the meloxicam while he is in [place].       Please advise

## 2024-02-21 NOTE — TELEPHONE ENCOUNTER
We can send it down there to a local pharmacy when the time comes, no problems  Just would need to know where to send it when the time comes

## 2024-02-22 RX ORDER — MELOXICAM 15 MG/1
TABLET ORAL
Qty: 90 TABLET | Refills: 3 | Status: SHIPPED | OUTPATIENT
Start: 2024-02-22

## 2024-02-22 NOTE — TELEPHONE ENCOUNTER
Patient informed, verbally understood. He will call when he gets an address for the pharmacy in [place] but for the time being he is still here and requested a refill. Med pended.

## 2024-03-08 ENCOUNTER — NURSE ONLY (OUTPATIENT)
Dept: LAB | Age: 49
End: 2024-03-08

## 2024-03-08 DIAGNOSIS — E11.9 TYPE 2 DIABETES MELLITUS WITHOUT COMPLICATION, WITHOUT LONG-TERM CURRENT USE OF INSULIN (HCC): ICD-10-CM

## 2024-03-08 LAB
ALBUMIN SERPL BCG-MCNC: 4.4 G/DL (ref 3.5–5.1)
ALP SERPL-CCNC: 96 U/L (ref 38–126)
ALT SERPL W/O P-5'-P-CCNC: 34 U/L (ref 11–66)
ANION GAP SERPL CALC-SCNC: 10 MEQ/L (ref 8–16)
AST SERPL-CCNC: 20 U/L (ref 5–40)
BASOPHILS ABSOLUTE: 0 THOU/MM3 (ref 0–0.1)
BASOPHILS NFR BLD AUTO: 0.5 %
BILIRUB SERPL-MCNC: 0.7 MG/DL (ref 0.3–1.2)
BUN SERPL-MCNC: 10 MG/DL (ref 7–22)
CALCIUM SERPL-MCNC: 9.9 MG/DL (ref 8.5–10.5)
CHLORIDE SERPL-SCNC: 102 MEQ/L (ref 98–111)
CHOLEST SERPL-MCNC: 143 MG/DL (ref 100–199)
CO2 SERPL-SCNC: 27 MEQ/L (ref 23–33)
CREAT SERPL-MCNC: 0.8 MG/DL (ref 0.4–1.2)
CREAT UR-MCNC: 182.6 MG/DL
DEPRECATED RDW RBC AUTO: 44.9 FL (ref 35–45)
EOSINOPHIL NFR BLD AUTO: 2.1 %
EOSINOPHILS ABSOLUTE: 0.2 THOU/MM3 (ref 0–0.4)
ERYTHROCYTE [DISTWIDTH] IN BLOOD BY AUTOMATED COUNT: 13.5 % (ref 11.5–14.5)
GFR SERPL CREATININE-BSD FRML MDRD: > 60 ML/MIN/1.73M2
GLUCOSE SERPL-MCNC: 135 MG/DL (ref 70–108)
HCT VFR BLD AUTO: 48.2 % (ref 42–52)
HDLC SERPL-MCNC: 40 MG/DL
HGB BLD-MCNC: 15.5 GM/DL (ref 14–18)
IMM GRANULOCYTES # BLD AUTO: 0.02 THOU/MM3 (ref 0–0.07)
IMM GRANULOCYTES NFR BLD AUTO: 0.3 %
LDLC SERPL CALC-MCNC: 92 MG/DL
LYMPHOCYTES ABSOLUTE: 3.1 THOU/MM3 (ref 1–4.8)
LYMPHOCYTES NFR BLD AUTO: 40.7 %
MCH RBC QN AUTO: 29 PG (ref 26–33)
MCHC RBC AUTO-ENTMCNC: 32.2 GM/DL (ref 32.2–35.5)
MCV RBC AUTO: 90.1 FL (ref 80–94)
MICROALBUMIN UR-MCNC: 2.7 MG/DL
MICROALBUMIN/CREAT RATIO PNL UR: 15 MG/G (ref 0–30)
MONOCYTES ABSOLUTE: 0.6 THOU/MM3 (ref 0.4–1.3)
MONOCYTES NFR BLD AUTO: 7.6 %
NEUTROPHILS NFR BLD AUTO: 48.8 %
NRBC BLD AUTO-RTO: 0 /100 WBC
PLATELET # BLD AUTO: 368 THOU/MM3 (ref 130–400)
PMV BLD AUTO: 9.7 FL (ref 9.4–12.4)
POTASSIUM SERPL-SCNC: 4.3 MEQ/L (ref 3.5–5.2)
PROT SERPL-MCNC: 8 G/DL (ref 6.1–8)
RBC # BLD AUTO: 5.35 MILL/MM3 (ref 4.7–6.1)
SEGMENTED NEUTROPHILS ABSOLUTE COUNT: 3.8 THOU/MM3 (ref 1.8–7.7)
SODIUM SERPL-SCNC: 139 MEQ/L (ref 135–145)
TRIGL SERPL-MCNC: 57 MG/DL (ref 0–199)
TSH SERPL DL<=0.005 MIU/L-ACNC: 0.88 UIU/ML (ref 0.4–4.2)
WBC # BLD AUTO: 7.7 THOU/MM3 (ref 4.8–10.8)

## 2024-03-11 ENCOUNTER — TELEPHONE (OUTPATIENT)
Dept: FAMILY MEDICINE CLINIC | Age: 49
End: 2024-03-11

## 2024-03-11 DIAGNOSIS — I10 HYPERTENSION, ESSENTIAL: ICD-10-CM

## 2024-03-11 RX ORDER — LISINOPRIL 40 MG/1
40 TABLET ORAL DAILY
Qty: 90 TABLET | Refills: 3 | Status: SHIPPED | OUTPATIENT
Start: 2024-03-11

## 2024-03-11 NOTE — PROGRESS NOTES
Chief Complaint   Patient presents with    Diabetes     History obtained from the patient.    SUBJECTIVE:  Reynaldo Patton is a 49 y.o. male that presents today for     -DM2:  Diet controlled  A1c 6.9  Working on diet changes  Exercising.   Not as active as should be    Lab Results   Component Value Date/Time    LABA1C 6.9 03/12/2024 08:05 AM    LABA1C 6.7 09/11/2023 06:42 AM    LABA1C 6.6 03/01/2023 07:11 AM    LABA1C 6.5 08/23/2022 07:30 AM    LABA1C 6.3 04/28/2021 07:40 AM       -HTN:    HPI:    Taking meds as prescribed ?: yes  Tolerating well ?: yes  Side Effects ?: no  BP at home ?: <140/90  Working on TLCS ?: yes  Chest Pain/SOB/Palpitations? no      -HLD:    HPI:    Taking meds as prescribed ?: yes  Tolerating well ?: yes  Side Effects ?: no  Muscle Pain?: no  Working on TLCS ?: yes      -PATRIC LAST VISIT:  Had sleep study at [facility] in DEC 2021  Told has PATRIC  Sent by [employer] [facility] by [employer]  Denies fatigue, wife states does not snore or stop breathing  Wants 2nd opinion    UPDATE TODAY:   Saw sleep clinic  Needs to schedule sleep study  Plans to soon      -OA L foot:  Chronic issue  On mobic  Works pretty well overall       -GERD:    HPI:    Taking meds as prescribed ?: yes  Tolerating well ?: yes  Side Effects ?: no  Dysphagia ?: no  Odynophagia ?: no  Melena ?: yes  Working on TLCS ?: no      -Smoking:  Tried and failed chantix, wellbutrin, didn't like how made him feel  Willing to try tiki replacement again  1 PPD      Age/Gender Health Maintenance    Lipid -   Lab Results   Component Value Date    CHOL 143 03/08/2024    CHOL 135 03/01/2023    CHOL 142 08/23/2022     Lab Results   Component Value Date    TRIG 57 03/08/2024    TRIG 81 03/01/2023    TRIG 75 08/23/2022     Lab Results   Component Value Date    HDL 40 03/08/2024    HDL 43 03/01/2023    HDL 39 08/23/2022     Lab Results   Component Value Date    LDLCALC 92 03/08/2024    LDLCALC 76 03/01/2023    LDLCALC 88 08/23/2022       Colon Cancer Screening -

## 2024-03-11 NOTE — TELEPHONE ENCOUNTER
----- Message from Wei Granda DO sent at 3/10/2024  8:41 AM EDT -----  Please let pt know that labs are stable and appropriate. Let me know if questions, thanks!

## 2024-03-11 NOTE — TELEPHONE ENCOUNTER
Recent Visits  Date Type Provider Dept   09/11/23 Office Visit Wei Granda, DO [facility]   03/02/23 Office Visit Wei Granda, DO [facility]   Showing recent visits within past 540 days with a meds authorizing provider and meeting all other requirements  Future Appointments  Date Type Provider Dept   03/12/24 Appointment Wei Granda, DO [facility]   Showing future appointments within next 150 days with a meds authorizing provider and meeting all other requirements

## 2024-03-12 ENCOUNTER — OFFICE VISIT (OUTPATIENT)
Dept: FAMILY MEDICINE CLINIC | Age: 49
End: 2024-03-12
Payer: MEDICAID

## 2024-03-12 VITALS
RESPIRATION RATE: 18 BRPM | OXYGEN SATURATION: 98 % | BODY MASS INDEX: 39.28 KG/M2 | TEMPERATURE: 97 F | DIASTOLIC BLOOD PRESSURE: 74 MMHG | WEIGHT: 296.4 LBS | HEIGHT: 73 IN | SYSTOLIC BLOOD PRESSURE: 132 MMHG | HEART RATE: 70 BPM

## 2024-03-12 DIAGNOSIS — M19.072 PRIMARY OSTEOARTHRITIS OF LEFT FOOT: ICD-10-CM

## 2024-03-12 DIAGNOSIS — E11.9 TYPE 2 DIABETES MELLITUS WITHOUT COMPLICATION, WITHOUT LONG-TERM CURRENT USE OF INSULIN (HCC): Primary | ICD-10-CM

## 2024-03-12 DIAGNOSIS — F17.210 CIGARETTE NICOTINE DEPENDENCE WITHOUT COMPLICATION: ICD-10-CM

## 2024-03-12 DIAGNOSIS — K21.9 GASTROESOPHAGEAL REFLUX DISEASE, UNSPECIFIED WHETHER ESOPHAGITIS PRESENT: ICD-10-CM

## 2024-03-12 DIAGNOSIS — E78.5 DYSLIPIDEMIA: ICD-10-CM

## 2024-03-12 DIAGNOSIS — I10 HYPERTENSION, ESSENTIAL: ICD-10-CM

## 2024-03-12 DIAGNOSIS — Z23 NEED FOR VACCINATION: ICD-10-CM

## 2024-03-12 DIAGNOSIS — E66.9 OBESITY (BMI 30-39.9): ICD-10-CM

## 2024-03-12 DIAGNOSIS — G47.33 OSA (OBSTRUCTIVE SLEEP APNEA): ICD-10-CM

## 2024-03-12 LAB
HBA1C MFR BLD: 6.9 %
HBA1C MFR BLD: 6.9 % (ref 4.3–5.7)

## 2024-03-12 PROCEDURE — 83036 HEMOGLOBIN GLYCOSYLATED A1C: CPT | Performed by: FAMILY MEDICINE

## 2024-03-12 PROCEDURE — 3075F SYST BP GE 130 - 139MM HG: CPT | Performed by: FAMILY MEDICINE

## 2024-03-12 PROCEDURE — 90472 IMMUNIZATION ADMIN EACH ADD: CPT | Performed by: FAMILY MEDICINE

## 2024-03-12 PROCEDURE — 3078F DIAST BP <80 MM HG: CPT | Performed by: FAMILY MEDICINE

## 2024-03-12 PROCEDURE — 90471 IMMUNIZATION ADMIN: CPT | Performed by: FAMILY MEDICINE

## 2024-03-12 PROCEDURE — 3044F HG A1C LEVEL LT 7.0%: CPT | Performed by: FAMILY MEDICINE

## 2024-03-12 PROCEDURE — 90677 PCV20 VACCINE IM: CPT | Performed by: FAMILY MEDICINE

## 2024-03-12 PROCEDURE — 99214 OFFICE O/P EST MOD 30 MIN: CPT | Performed by: FAMILY MEDICINE

## 2024-03-12 PROCEDURE — 90715 TDAP VACCINE 7 YRS/> IM: CPT | Performed by: FAMILY MEDICINE

## 2024-03-12 RX ORDER — NICOTINE 21 MG/24HR
1 PATCH, TRANSDERMAL 24 HOURS TRANSDERMAL EVERY 24 HOURS
Qty: 14 PATCH | Refills: 0 | Status: SHIPPED | OUTPATIENT
Start: 2024-04-23 | End: 2024-05-07

## 2024-03-12 RX ORDER — NICOTINE 21 MG/24HR
1 PATCH, TRANSDERMAL 24 HOURS TRANSDERMAL EVERY 24 HOURS
Qty: 42 PATCH | Refills: 0 | Status: SHIPPED | OUTPATIENT
Start: 2024-03-12 | End: 2024-04-23

## 2024-03-12 SDOH — ECONOMIC STABILITY: FOOD INSECURITY: WITHIN THE PAST 12 MONTHS, YOU WORRIED THAT YOUR FOOD WOULD RUN OUT BEFORE YOU GOT MONEY TO BUY MORE.: NEVER TRUE

## 2024-03-12 SDOH — ECONOMIC STABILITY: INCOME INSECURITY: HOW HARD IS IT FOR YOU TO PAY FOR THE VERY BASICS LIKE FOOD, HOUSING, MEDICAL CARE, AND HEATING?: NOT HARD AT ALL

## 2024-03-12 SDOH — ECONOMIC STABILITY: FOOD INSECURITY: WITHIN THE PAST 12 MONTHS, THE FOOD YOU BOUGHT JUST DIDN'T LAST AND YOU DIDN'T HAVE MONEY TO GET MORE.: NEVER TRUE

## 2024-03-12 ASSESSMENT — PATIENT HEALTH QUESTIONNAIRE - PHQ9
SUM OF ALL RESPONSES TO PHQ QUESTIONS 1-9: 0
1. LITTLE INTEREST OR PLEASURE IN DOING THINGS: 0
SUM OF ALL RESPONSES TO PHQ QUESTIONS 1-9: 0
SUM OF ALL RESPONSES TO PHQ9 QUESTIONS 1 & 2: 0
SUM OF ALL RESPONSES TO PHQ QUESTIONS 1-9: 0
2. FEELING DOWN, DEPRESSED OR HOPELESS: 0
SUM OF ALL RESPONSES TO PHQ QUESTIONS 1-9: 0

## 2024-03-12 NOTE — PROGRESS NOTES
Immunization(s) given during visit:    Immunizations Administered       Name Date Dose Route    Pneumococcal, PCV20, PREVNAR 20, (age 6w+), IM, 0.5mL 3/12/2024 0.5 mL Intramuscular    Site: Deltoid- Left    Lot: [id number]    NDC: 1919-4703-71    TDaP, ADACEL (age 10y-64y), BOOSTRIX (age 10y+), IM, 0.5mL 3/12/2024 0.5 mL Intramuscular    Site: Deltoid- Left    Lot: [id number]    NDC: 27031-629-48            Most recent Vaccine Information Sheet dated 8.6.2021, 5.12.2023 given to pt  Pt verbalized ok to give both vaccinations in left deltoid

## 2024-03-26 DIAGNOSIS — J30.9 ALLERGIC RHINITIS, UNSPECIFIED SEASONALITY, UNSPECIFIED TRIGGER: ICD-10-CM

## 2024-03-26 DIAGNOSIS — I10 HYPERTENSION, ESSENTIAL: ICD-10-CM

## 2024-03-26 DIAGNOSIS — G47.33 OSA ON CPAP: ICD-10-CM

## 2024-03-26 DIAGNOSIS — M79.672 LEFT FOOT PAIN: ICD-10-CM

## 2024-03-26 RX ORDER — FLUTICASONE PROPIONATE 50 MCG
2 SPRAY, SUSPENSION (ML) NASAL DAILY
Qty: 16 G | Refills: 11 | OUTPATIENT
Start: 2024-03-26

## 2024-03-26 NOTE — TELEPHONE ENCOUNTER
Recent Visits  Date Type Provider Dept   03/12/24 Office Visit Wei Granda, DO [facility]   09/11/23 Office Visit Wei Granda, DO [facility]   03/02/23 Office Visit Wei Granda, DO [facility]   Showing recent visits within past 540 days with a meds authorizing provider and meeting all other requirements  Future Appointments  No visits were found meeting these conditions.  Showing future appointments within next 150 days with a meds authorizing provider and meeting all other requirements

## 2024-03-27 RX ORDER — MELOXICAM 15 MG/1
TABLET ORAL
Qty: 90 TABLET | Refills: 3 | Status: SHIPPED | OUTPATIENT
Start: 2024-03-27

## 2024-11-05 DIAGNOSIS — I10 HYPERTENSION, ESSENTIAL: ICD-10-CM

## 2024-11-05 RX ORDER — AMLODIPINE BESYLATE 10 MG/1
10 TABLET ORAL DAILY
Qty: 90 TABLET | Refills: 3 | Status: SHIPPED | OUTPATIENT
Start: 2024-11-05

## 2024-11-25 DIAGNOSIS — K21.9 GASTROESOPHAGEAL REFLUX DISEASE, UNSPECIFIED WHETHER ESOPHAGITIS PRESENT: ICD-10-CM

## 2024-11-25 NOTE — TELEPHONE ENCOUNTER
Recent Visits  Date Type Provider Dept   03/12/24 Office Visit Wei Granda, DO [facility]   09/11/23 Office Visit Wei Granda, DO [facility]   Showing recent visits within past 540 days with a meds authorizing provider and meeting all other requirements  Future Appointments  No visits were found meeting these conditions.  Showing future appointments within next 150 days with a meds authorizing provider and meeting all other requirements

## 2024-12-11 DIAGNOSIS — E78.5 DYSLIPIDEMIA: ICD-10-CM

## 2024-12-12 RX ORDER — ATORVASTATIN CALCIUM 20 MG/1
TABLET, FILM COATED ORAL
Qty: 90 TABLET | Refills: 3 | Status: SHIPPED | OUTPATIENT
Start: 2024-12-12

## 2025-02-05 DIAGNOSIS — M79.672 LEFT FOOT PAIN: ICD-10-CM

## 2025-02-06 RX ORDER — MELOXICAM 15 MG/1
TABLET ORAL
Qty: 90 TABLET | Refills: 3 | Status: SHIPPED | OUTPATIENT
Start: 2025-02-06